# Patient Record
Sex: FEMALE | Race: WHITE | NOT HISPANIC OR LATINO | Employment: OTHER | URBAN - METROPOLITAN AREA
[De-identification: names, ages, dates, MRNs, and addresses within clinical notes are randomized per-mention and may not be internally consistent; named-entity substitution may affect disease eponyms.]

---

## 2017-01-01 ENCOUNTER — APPOINTMENT (OUTPATIENT)
Dept: CT IMAGING | Facility: HOSPITAL | Age: 57
End: 2017-01-01

## 2017-01-01 ENCOUNTER — HOSPITAL ENCOUNTER (INPATIENT)
Facility: HOSPITAL | Age: 57
LOS: 5 days | End: 2017-01-07
Attending: EMERGENCY MEDICINE | Admitting: FAMILY MEDICINE

## 2017-01-01 ENCOUNTER — APPOINTMENT (OUTPATIENT)
Dept: ULTRASOUND IMAGING | Facility: HOSPITAL | Age: 57
End: 2017-01-01

## 2017-01-01 ENCOUNTER — APPOINTMENT (OUTPATIENT)
Dept: GENERAL RADIOLOGY | Facility: HOSPITAL | Age: 57
End: 2017-01-01

## 2017-01-01 ENCOUNTER — APPOINTMENT (OUTPATIENT)
Dept: CARDIOLOGY | Facility: HOSPITAL | Age: 57
End: 2017-01-01
Attending: INTERNAL MEDICINE

## 2017-01-01 VITALS
RESPIRATION RATE: 10 BRPM | HEIGHT: 67 IN | SYSTOLIC BLOOD PRESSURE: 97 MMHG | HEART RATE: 100 BPM | OXYGEN SATURATION: 98 % | WEIGHT: 239.19 LBS | BODY MASS INDEX: 37.54 KG/M2 | TEMPERATURE: 98.3 F | DIASTOLIC BLOOD PRESSURE: 45 MMHG

## 2017-01-01 DIAGNOSIS — E87.5 HYPERKALEMIA: ICD-10-CM

## 2017-01-01 DIAGNOSIS — N17.9 ACUTE RENAL FAILURE, UNSPECIFIED ACUTE RENAL FAILURE TYPE (HCC): Primary | ICD-10-CM

## 2017-01-01 LAB
ALBUMIN SERPL-MCNC: 3.2 G/DL (ref 3.2–4.8)
ALBUMIN SERPL-MCNC: 3.4 G/DL (ref 3.2–4.8)
ALBUMIN SERPL-MCNC: 3.6 G/DL (ref 3.2–4.8)
ALBUMIN/GLOB SERPL: 1.2 G/DL (ref 1.5–2.5)
ALP SERPL-CCNC: 145 U/L (ref 25–100)
ALT SERPL W P-5'-P-CCNC: 17 U/L (ref 7–40)
ANION GAP SERPL CALCULATED.3IONS-SCNC: 13 MMOL/L (ref 3–11)
ANION GAP SERPL CALCULATED.3IONS-SCNC: 14 MMOL/L (ref 3–11)
ANION GAP SERPL CALCULATED.3IONS-SCNC: 16 MMOL/L (ref 3–11)
ANION GAP SERPL CALCULATED.3IONS-SCNC: 20 MMOL/L (ref 3–11)
AST SERPL-CCNC: 28 U/L (ref 0–33)
BASOPHILS # BLD AUTO: 0.01 10*3/MM3 (ref 0–0.2)
BASOPHILS NFR BLD AUTO: 0.1 % (ref 0–1)
BILIRUB SERPL-MCNC: 0.2 MG/DL (ref 0.3–1.2)
BNP SERPL-MCNC: 42 PG/ML (ref 0–100)
BUN BLD-MCNC: 120 MG/DL (ref 9–23)
BUN BLD-MCNC: 89 MG/DL (ref 9–23)
BUN BLD-MCNC: 97 MG/DL (ref 9–23)
BUN BLD-MCNC: 99 MG/DL (ref 9–23)
BUN BLDA-MCNC: 98 MG/DL (ref 8–26)
BUN/CREAT SERPL: 15.4 (ref 7–25)
BUN/CREAT SERPL: 16.2 (ref 7–25)
BUN/CREAT SERPL: 17 (ref 7–25)
BUN/CREAT SERPL: 17.8 (ref 7–25)
CA-I BLDA-SCNC: 1.43 MMOL/L (ref 1.2–1.32)
CALCIUM SPEC-SCNC: 11 MG/DL (ref 8.7–10.4)
CALCIUM SPEC-SCNC: 9.1 MG/DL (ref 8.7–10.4)
CALCIUM SPEC-SCNC: 9.4 MG/DL (ref 8.7–10.4)
CALCIUM SPEC-SCNC: 9.8 MG/DL (ref 8.7–10.4)
CHLORIDE BLDA-SCNC: 96 MMOL/L (ref 98–109)
CHLORIDE SERPL-SCNC: 94 MMOL/L (ref 99–109)
CHLORIDE SERPL-SCNC: 98 MMOL/L (ref 99–109)
CHLORIDE SERPL-SCNC: 98 MMOL/L (ref 99–109)
CHLORIDE SERPL-SCNC: 99 MMOL/L (ref 99–109)
CK SERPL-CCNC: 127 U/L (ref 26–174)
CO2 BLDA-SCNC: 22 MMOL/L (ref 24–29)
CO2 SERPL-SCNC: 12 MMOL/L (ref 20–31)
CO2 SERPL-SCNC: 17 MMOL/L (ref 20–31)
CO2 SERPL-SCNC: 19 MMOL/L (ref 20–31)
CO2 SERPL-SCNC: 22 MMOL/L (ref 20–31)
CREAT BLD-MCNC: 5 MG/DL (ref 0.6–1.3)
CREAT BLD-MCNC: 5.7 MG/DL (ref 0.6–1.3)
CREAT BLD-MCNC: 6.1 MG/DL (ref 0.6–1.3)
CREAT BLD-MCNC: 7.8 MG/DL (ref 0.6–1.3)
CREAT BLDA-MCNC: 5.8 MG/DL (ref 0.6–1.3)
DEPRECATED RDW RBC AUTO: 61.1 FL (ref 37–54)
EOSINOPHIL # BLD AUTO: 0 10*3/MM3 (ref 0.1–0.3)
EOSINOPHIL NFR BLD AUTO: 0 % (ref 0–3)
ERYTHROCYTE [DISTWIDTH] IN BLOOD BY AUTOMATED COUNT: 17.2 % (ref 11.3–14.5)
GFR SERPL CREATININE-BSD FRML MDRD: 5 ML/MIN/1.73
GFR SERPL CREATININE-BSD FRML MDRD: 7 ML/MIN/1.73
GFR SERPL CREATININE-BSD FRML MDRD: 8 ML/MIN/1.73
GFR SERPL CREATININE-BSD FRML MDRD: 9 ML/MIN/1.73
GLOBULIN UR ELPH-MCNC: 3.1 GM/DL
GLUCOSE BLD-MCNC: 108 MG/DL (ref 70–100)
GLUCOSE BLD-MCNC: 156 MG/DL (ref 70–100)
GLUCOSE BLD-MCNC: 88 MG/DL (ref 70–100)
GLUCOSE BLD-MCNC: 98 MG/DL (ref 70–100)
GLUCOSE BLDC GLUCOMTR-MCNC: 103 MG/DL (ref 70–130)
GLUCOSE BLDC GLUCOMTR-MCNC: 104 MG/DL (ref 70–130)
GLUCOSE BLDC GLUCOMTR-MCNC: 108 MG/DL (ref 70–130)
GLUCOSE BLDC GLUCOMTR-MCNC: 71 MG/DL (ref 70–130)
GLUCOSE BLDC GLUCOMTR-MCNC: 80 MG/DL (ref 70–130)
GLUCOSE BLDC GLUCOMTR-MCNC: 90 MG/DL (ref 70–130)
GLUCOSE BLDC GLUCOMTR-MCNC: 94 MG/DL (ref 70–130)
GLUCOSE BLDC GLUCOMTR-MCNC: 99 MG/DL (ref 70–130)
HCT VFR BLD AUTO: 27.6 % (ref 34.5–44)
HCT VFR BLDA CALC: 28 % (ref 38–51)
HGB BLD-MCNC: 9.1 G/DL (ref 11.5–15.5)
HGB BLDA-MCNC: 9.5 G/DL (ref 12–17)
HOLD SPECIMEN: NORMAL
HOLD SPECIMEN: NORMAL
IMM GRANULOCYTES # BLD: 0.08 10*3/MM3 (ref 0–0.03)
IMM GRANULOCYTES NFR BLD: 0.7 % (ref 0–0.6)
LIPASE SERPL-CCNC: 42 U/L (ref 6–51)
LYMPHOCYTES # BLD AUTO: 0.49 10*3/MM3 (ref 0.6–4.8)
LYMPHOCYTES NFR BLD AUTO: 4.4 % (ref 24–44)
MAGNESIUM SERPL-MCNC: 1.8 MG/DL (ref 1.3–2.7)
MCH RBC QN AUTO: 31.9 PG (ref 27–31)
MCHC RBC AUTO-ENTMCNC: 33 G/DL (ref 32–36)
MCV RBC AUTO: 96.8 FL (ref 80–99)
MONOCYTES # BLD AUTO: 0.53 10*3/MM3 (ref 0–1)
MONOCYTES NFR BLD AUTO: 4.8 % (ref 0–12)
NEUTROPHILS # BLD AUTO: 9.99 10*3/MM3 (ref 1.5–8.3)
NEUTROPHILS NFR BLD AUTO: 90 % (ref 41–71)
PHOSPHATE SERPL-MCNC: 5.8 MG/DL (ref 2.4–5.1)
PHOSPHATE SERPL-MCNC: 6.1 MG/DL (ref 2.4–5.1)
PHOSPHATE SERPL-MCNC: 8.9 MG/DL (ref 2.4–5.1)
PLATELET # BLD AUTO: 361 10*3/MM3 (ref 150–450)
PMV BLD AUTO: 9 FL (ref 6–12)
POTASSIUM BLD-SCNC: 6.3 MMOL/L (ref 3.5–5.5)
POTASSIUM BLD-SCNC: 6.3 MMOL/L (ref 3.5–5.5)
POTASSIUM BLD-SCNC: 6.6 MMOL/L (ref 3.5–5.5)
POTASSIUM BLD-SCNC: 7.6 MMOL/L (ref 3.5–5.5)
POTASSIUM BLDA-SCNC: 6.6 MMOL/L (ref 3.5–4.9)
PROT SERPL-MCNC: 6.7 G/DL (ref 5.7–8.2)
RBC # BLD AUTO: 2.85 10*6/MM3 (ref 3.89–5.14)
SODIUM BLD-SCNC: 128 MMOL/L (ref 132–146)
SODIUM BLD-SCNC: 131 MMOL/L (ref 132–146)
SODIUM BLD-SCNC: 131 MMOL/L (ref 132–146)
SODIUM BLD-SCNC: 132 MMOL/L (ref 132–146)
SODIUM BLDA-SCNC: 126 MMOL/L (ref 138–146)
TROPONIN I SERPL-MCNC: 0 NG/ML (ref 0–0.07)
URATE SERPL-MCNC: 14.1 MG/DL (ref 3.1–7.8)
WBC NRBC COR # BLD: 11.1 10*3/MM3 (ref 3.5–10.8)
WHOLE BLOOD HOLD SPECIMEN: NORMAL
WHOLE BLOOD HOLD SPECIMEN: NORMAL

## 2017-01-01 PROCEDURE — 25010000002 MORPHINE SULFATE (PF) 2 MG/ML SOLUTION: Performed by: INTERNAL MEDICINE

## 2017-01-01 PROCEDURE — 25010000002 MORPHINE SULFATE (PF) 2 MG/ML SOLUTION: Performed by: FAMILY MEDICINE

## 2017-01-01 PROCEDURE — 99232 SBSQ HOSP IP/OBS MODERATE 35: CPT | Performed by: INTERNAL MEDICINE

## 2017-01-01 PROCEDURE — 83735 ASSAY OF MAGNESIUM: CPT | Performed by: EMERGENCY MEDICINE

## 2017-01-01 PROCEDURE — 99221 1ST HOSP IP/OBS SF/LOW 40: CPT | Performed by: INTERNAL MEDICINE

## 2017-01-01 PROCEDURE — 25010000002 FUROSEMIDE PER 20 MG: Performed by: INTERNAL MEDICINE

## 2017-01-01 PROCEDURE — 74176 CT ABD & PELVIS W/O CONTRAST: CPT

## 2017-01-01 PROCEDURE — 82962 GLUCOSE BLOOD TEST: CPT

## 2017-01-01 PROCEDURE — 99233 SBSQ HOSP IP/OBS HIGH 50: CPT | Performed by: INTERNAL MEDICINE

## 2017-01-01 PROCEDURE — 80069 RENAL FUNCTION PANEL: CPT | Performed by: INTERNAL MEDICINE

## 2017-01-01 PROCEDURE — 25010000002 ONDANSETRON PER 1 MG: Performed by: FAMILY MEDICINE

## 2017-01-01 PROCEDURE — 71010 HC CHEST PA OR AP: CPT

## 2017-01-01 PROCEDURE — 80053 COMPREHEN METABOLIC PANEL: CPT | Performed by: EMERGENCY MEDICINE

## 2017-01-01 PROCEDURE — 84484 ASSAY OF TROPONIN QUANT: CPT

## 2017-01-01 PROCEDURE — 25010000002 HEPARIN (PORCINE) PER 1000 UNITS: Performed by: FAMILY MEDICINE

## 2017-01-01 PROCEDURE — 25010000002 DEXAMETHASONE PER 1 MG: Performed by: FAMILY MEDICINE

## 2017-01-01 PROCEDURE — 76775 US EXAM ABDO BACK WALL LIM: CPT

## 2017-01-01 PROCEDURE — 25010000002 LORAZEPAM PER 2 MG: Performed by: FAMILY MEDICINE

## 2017-01-01 PROCEDURE — 80047 BASIC METABLC PNL IONIZED CA: CPT

## 2017-01-01 PROCEDURE — 83880 ASSAY OF NATRIURETIC PEPTIDE: CPT | Performed by: EMERGENCY MEDICINE

## 2017-01-01 PROCEDURE — 85025 COMPLETE CBC W/AUTO DIFF WBC: CPT | Performed by: EMERGENCY MEDICINE

## 2017-01-01 PROCEDURE — 99223 1ST HOSP IP/OBS HIGH 75: CPT | Performed by: FAMILY MEDICINE

## 2017-01-01 PROCEDURE — 80048 BASIC METABOLIC PNL TOTAL CA: CPT | Performed by: FAMILY MEDICINE

## 2017-01-01 PROCEDURE — 82550 ASSAY OF CK (CPK): CPT | Performed by: INTERNAL MEDICINE

## 2017-01-01 PROCEDURE — 83690 ASSAY OF LIPASE: CPT | Performed by: EMERGENCY MEDICINE

## 2017-01-01 PROCEDURE — 63710000001 DEXAMETHASONE PER 0.25 MG: Performed by: FAMILY MEDICINE

## 2017-01-01 PROCEDURE — 85014 HEMATOCRIT: CPT

## 2017-01-01 PROCEDURE — 93005 ELECTROCARDIOGRAM TRACING: CPT

## 2017-01-01 PROCEDURE — 99284 EMERGENCY DEPT VISIT MOD MDM: CPT

## 2017-01-01 PROCEDURE — 84550 ASSAY OF BLOOD/URIC ACID: CPT | Performed by: INTERNAL MEDICINE

## 2017-01-01 PROCEDURE — 25010000002 HYDROMORPHONE PER 4 MG: Performed by: FAMILY MEDICINE

## 2017-01-01 PROCEDURE — 25010000002 HYDROMORPHONE PER 4 MG: Performed by: EMERGENCY MEDICINE

## 2017-01-01 PROCEDURE — 84100 ASSAY OF PHOSPHORUS: CPT | Performed by: EMERGENCY MEDICINE

## 2017-01-01 RX ORDER — PROMETHAZINE HYDROCHLORIDE 25 MG/ML
12.5 INJECTION, SOLUTION INTRAMUSCULAR; INTRAVENOUS EVERY 6 HOURS PRN
Status: DISCONTINUED | OUTPATIENT
Start: 2017-01-01 | End: 2017-01-01 | Stop reason: HOSPADM

## 2017-01-01 RX ORDER — DEXTROSE MONOHYDRATE 25 G/50ML
25 INJECTION, SOLUTION INTRAVENOUS AS NEEDED
Status: DISCONTINUED | OUTPATIENT
Start: 2017-01-01 | End: 2017-01-01 | Stop reason: HOSPADM

## 2017-01-01 RX ORDER — CALCIUM ACETATE 667 MG/1
667 CAPSULE ORAL
Status: DISCONTINUED | OUTPATIENT
Start: 2017-01-01 | End: 2017-01-01 | Stop reason: HOSPADM

## 2017-01-01 RX ORDER — NICOTINE POLACRILEX 4 MG
15 LOZENGE BUCCAL AS NEEDED
Status: DISCONTINUED | OUTPATIENT
Start: 2017-01-01 | End: 2017-01-01 | Stop reason: HOSPADM

## 2017-01-01 RX ORDER — NALOXONE HCL 0.4 MG/ML
0.4 VIAL (ML) INJECTION
Status: DISCONTINUED | OUTPATIENT
Start: 2017-01-01 | End: 2017-01-01

## 2017-01-01 RX ORDER — SODIUM CHLORIDE 9 MG/ML
75 INJECTION, SOLUTION INTRAVENOUS CONTINUOUS
Status: DISCONTINUED | OUTPATIENT
Start: 2017-01-01 | End: 2017-01-01

## 2017-01-01 RX ORDER — CITALOPRAM 40 MG/1
40 TABLET ORAL DAILY
Status: DISCONTINUED | OUTPATIENT
Start: 2017-01-01 | End: 2017-01-01 | Stop reason: HOSPADM

## 2017-01-01 RX ORDER — PROMETHAZINE HYDROCHLORIDE 12.5 MG/1
12.5 TABLET ORAL EVERY 6 HOURS PRN
Status: DISCONTINUED | OUTPATIENT
Start: 2017-01-01 | End: 2017-01-01 | Stop reason: HOSPADM

## 2017-01-01 RX ORDER — ONDANSETRON 2 MG/ML
4 INJECTION INTRAMUSCULAR; INTRAVENOUS ONCE
Status: COMPLETED | OUTPATIENT
Start: 2017-01-01 | End: 2017-01-01

## 2017-01-01 RX ORDER — SODIUM CHLORIDE 0.9 % (FLUSH) 0.9 %
1-10 SYRINGE (ML) INJECTION AS NEEDED
Status: DISCONTINUED | OUTPATIENT
Start: 2017-01-01 | End: 2017-01-01 | Stop reason: HOSPADM

## 2017-01-01 RX ORDER — GABAPENTIN 300 MG/1
300 CAPSULE ORAL EVERY 6 HOURS SCHEDULED
Status: DISCONTINUED | OUTPATIENT
Start: 2017-01-01 | End: 2017-01-01 | Stop reason: HOSPADM

## 2017-01-01 RX ORDER — FUROSEMIDE 10 MG/ML
40 INJECTION INTRAMUSCULAR; INTRAVENOUS ONCE
Status: COMPLETED | OUTPATIENT
Start: 2017-01-01 | End: 2017-01-01

## 2017-01-01 RX ORDER — LIDOCAINE 50 MG/G
1 PATCH TOPICAL
Status: DISCONTINUED | OUTPATIENT
Start: 2017-01-01 | End: 2017-01-01 | Stop reason: HOSPADM

## 2017-01-01 RX ORDER — PANTOPRAZOLE SODIUM 40 MG/10ML
40 INJECTION, POWDER, LYOPHILIZED, FOR SOLUTION INTRAVENOUS EVERY 12 HOURS SCHEDULED
Status: DISCONTINUED | OUTPATIENT
Start: 2017-01-01 | End: 2017-01-01 | Stop reason: HOSPADM

## 2017-01-01 RX ORDER — POLYETHYLENE GLYCOL 3350 17 G/17G
17 POWDER, FOR SOLUTION ORAL DAILY PRN
Status: DISCONTINUED | OUTPATIENT
Start: 2017-01-01 | End: 2017-01-01 | Stop reason: HOSPADM

## 2017-01-01 RX ORDER — SODIUM CHLORIDE 0.9 % (FLUSH) 0.9 %
10 SYRINGE (ML) INJECTION AS NEEDED
Status: DISCONTINUED | OUTPATIENT
Start: 2017-01-01 | End: 2017-01-01 | Stop reason: HOSPADM

## 2017-01-01 RX ORDER — HEPARIN SODIUM 5000 [USP'U]/ML
5000 INJECTION, SOLUTION INTRAVENOUS; SUBCUTANEOUS EVERY 8 HOURS
Status: DISCONTINUED | OUTPATIENT
Start: 2017-01-01 | End: 2017-01-01 | Stop reason: HOSPADM

## 2017-01-01 RX ORDER — SODIUM POLYSTYRENE SULFONATE 15 G/60ML
15 SUSPENSION ORAL; RECTAL ONCE
Status: DISCONTINUED | OUTPATIENT
Start: 2017-01-01 | End: 2017-01-01 | Stop reason: HOSPADM

## 2017-01-01 RX ORDER — FENTANYL 50 UG/H
1 PATCH TRANSDERMAL
Status: DISCONTINUED | OUTPATIENT
Start: 2017-01-01 | End: 2017-01-01 | Stop reason: HOSPADM

## 2017-01-01 RX ORDER — NALOXONE HCL 0.4 MG/ML
0.1 VIAL (ML) INJECTION
Status: DISCONTINUED | OUTPATIENT
Start: 2017-01-01 | End: 2017-01-01 | Stop reason: HOSPADM

## 2017-01-01 RX ORDER — LORAZEPAM 2 MG/ML
0.5 INJECTION INTRAMUSCULAR EVERY 6 HOURS PRN
Status: DISCONTINUED | OUTPATIENT
Start: 2017-01-01 | End: 2017-01-01 | Stop reason: HOSPADM

## 2017-01-01 RX ORDER — MORPHINE SULFATE 2 MG/ML
1 INJECTION, SOLUTION INTRAMUSCULAR; INTRAVENOUS
Status: DISCONTINUED | OUTPATIENT
Start: 2017-01-01 | End: 2017-01-01

## 2017-01-01 RX ORDER — GLYCOPYRROLATE 0.2 MG/ML
0.2 INJECTION INTRAMUSCULAR; INTRAVENOUS 4 TIMES DAILY
Status: DISCONTINUED | OUTPATIENT
Start: 2017-01-01 | End: 2017-01-01 | Stop reason: HOSPADM

## 2017-01-01 RX ORDER — FENTANYL 25 UG/H
1 PATCH TRANSDERMAL
Status: DISCONTINUED | OUTPATIENT
Start: 2017-01-01 | End: 2017-01-01

## 2017-01-01 RX ORDER — DEXAMETHASONE SODIUM PHOSPHATE 4 MG/ML
4 INJECTION, SOLUTION INTRA-ARTICULAR; INTRALESIONAL; INTRAMUSCULAR; INTRAVENOUS; SOFT TISSUE DAILY
Status: DISCONTINUED | OUTPATIENT
Start: 2017-01-01 | End: 2017-01-01 | Stop reason: HOSPADM

## 2017-01-01 RX ORDER — HYDROCODONE BITARTRATE AND ACETAMINOPHEN 5; 325 MG/1; MG/1
1 TABLET ORAL EVERY 4 HOURS PRN
Status: DISCONTINUED | OUTPATIENT
Start: 2017-01-01 | End: 2017-01-01 | Stop reason: HOSPADM

## 2017-01-01 RX ORDER — ONDANSETRON 2 MG/ML
4 INJECTION INTRAMUSCULAR; INTRAVENOUS EVERY 6 HOURS PRN
Status: DISCONTINUED | OUTPATIENT
Start: 2017-01-01 | End: 2017-01-01 | Stop reason: HOSPADM

## 2017-01-01 RX ORDER — MORPHINE SULFATE 2 MG/ML
1 INJECTION, SOLUTION INTRAMUSCULAR; INTRAVENOUS EVERY 4 HOURS PRN
Status: DISCONTINUED | OUTPATIENT
Start: 2017-01-01 | End: 2017-01-01

## 2017-01-01 RX ORDER — ATORVASTATIN CALCIUM 40 MG/1
40 TABLET, FILM COATED ORAL DAILY
Status: DISCONTINUED | OUTPATIENT
Start: 2017-01-01 | End: 2017-01-01 | Stop reason: HOSPADM

## 2017-01-01 RX ORDER — ASPIRIN 81 MG/1
324 TABLET, CHEWABLE ORAL ONCE
Status: COMPLETED | OUTPATIENT
Start: 2017-01-01 | End: 2017-01-01

## 2017-01-01 RX ORDER — MORPHINE SULFATE 2 MG/ML
1 INJECTION, SOLUTION INTRAMUSCULAR; INTRAVENOUS
Status: DISCONTINUED | OUTPATIENT
Start: 2017-01-01 | End: 2017-01-01 | Stop reason: HOSPADM

## 2017-01-01 RX ORDER — DEXAMETHASONE 4 MG/1
4 TABLET ORAL
Status: DISCONTINUED | OUTPATIENT
Start: 2017-01-01 | End: 2017-01-01

## 2017-01-01 RX ORDER — FUROSEMIDE 10 MG/ML
80 INJECTION INTRAMUSCULAR; INTRAVENOUS ONCE
Status: COMPLETED | OUTPATIENT
Start: 2017-01-01 | End: 2017-01-01

## 2017-01-01 RX ORDER — SODIUM POLYSTYRENE SULFONATE 15 G/60ML
30 SUSPENSION ORAL; RECTAL ONCE
Status: COMPLETED | OUTPATIENT
Start: 2017-01-01 | End: 2017-01-01

## 2017-01-01 RX ORDER — NALOXONE HCL 0.4 MG/ML
0.4 VIAL (ML) INJECTION
Status: DISCONTINUED | OUTPATIENT
Start: 2017-01-01 | End: 2017-01-01 | Stop reason: HOSPADM

## 2017-01-01 RX ADMIN — MORPHINE SULFATE 1 MG: 2 INJECTION, SOLUTION INTRAMUSCULAR; INTRAVENOUS at 21:21

## 2017-01-01 RX ADMIN — LORAZEPAM 0.5 MG: 2 INJECTION INTRAMUSCULAR; INTRAVENOUS at 08:14

## 2017-01-01 RX ADMIN — PANTOPRAZOLE SODIUM 40 MG: 40 INJECTION, POWDER, FOR SOLUTION INTRAVENOUS at 08:37

## 2017-01-01 RX ADMIN — MORPHINE SULFATE 1 MG: 2 INJECTION, SOLUTION INTRAMUSCULAR; INTRAVENOUS at 00:28

## 2017-01-01 RX ADMIN — DEXAMETHASONE SODIUM PHOSPHATE 4 MG: 4 INJECTION, SOLUTION INTRA-ARTICULAR; INTRALESIONAL; INTRAMUSCULAR; INTRAVENOUS; SOFT TISSUE at 13:16

## 2017-01-01 RX ADMIN — Medication: at 22:26

## 2017-01-01 RX ADMIN — CALCIUM ACETATE 667 MG: 667 CAPSULE ORAL at 08:03

## 2017-01-01 RX ADMIN — CITALOPRAM HYDROBROMIDE 40 MG: 40 TABLET ORAL at 08:03

## 2017-01-01 RX ADMIN — MORPHINE SULFATE 1 MG: 2 INJECTION, SOLUTION INTRAMUSCULAR; INTRAVENOUS at 11:58

## 2017-01-01 RX ADMIN — SODIUM POLYSTYRENE SULFONATE 30 G: 15 SUSPENSION ORAL; RECTAL at 17:45

## 2017-01-01 RX ADMIN — HYDROCODONE BITARTATE AND ACETAMINOPHEN 1 TABLET: 5; 325 TABLET ORAL at 02:07

## 2017-01-01 RX ADMIN — MORPHINE SULFATE 1 MG: 2 INJECTION, SOLUTION INTRAMUSCULAR; INTRAVENOUS at 01:51

## 2017-01-01 RX ADMIN — MORPHINE SULFATE 0.5 MG: 2 INJECTION, SOLUTION INTRAMUSCULAR; INTRAVENOUS at 00:39

## 2017-01-01 RX ADMIN — PANTOPRAZOLE SODIUM 40 MG: 40 INJECTION, POWDER, FOR SOLUTION INTRAVENOUS at 08:17

## 2017-01-01 RX ADMIN — NYSTATIN 500000 UNITS: 100000 SUSPENSION ORAL at 21:27

## 2017-01-01 RX ADMIN — MORPHINE SULFATE 1 MG: 2 INJECTION, SOLUTION INTRAMUSCULAR; INTRAVENOUS at 11:15

## 2017-01-01 RX ADMIN — LIDOCAINE HYDROCHLORIDE: 20 SOLUTION ORAL; TOPICAL at 10:32

## 2017-01-01 RX ADMIN — CALCIUM ACETATE 667 MG: 667 CAPSULE ORAL at 17:45

## 2017-01-01 RX ADMIN — Medication: at 05:50

## 2017-01-01 RX ADMIN — ONDANSETRON 4 MG: 2 INJECTION INTRAMUSCULAR; INTRAVENOUS at 21:16

## 2017-01-01 RX ADMIN — FENTANYL 1 PATCH: 50 PATCH, EXTENDED RELEASE TRANSDERMAL at 00:52

## 2017-01-01 RX ADMIN — LIDOCAINE HYDROCHLORIDE: 20 SOLUTION ORAL; TOPICAL at 08:33

## 2017-01-01 RX ADMIN — LORAZEPAM 0.5 MG: 2 INJECTION INTRAMUSCULAR; INTRAVENOUS at 17:29

## 2017-01-01 RX ADMIN — LIDOCAINE HYDROCHLORIDE: 20 SOLUTION ORAL; TOPICAL at 08:04

## 2017-01-01 RX ADMIN — MORPHINE SULFATE 1 MG: 2 INJECTION, SOLUTION INTRAMUSCULAR; INTRAVENOUS at 05:40

## 2017-01-01 RX ADMIN — DEXAMETHASONE SODIUM PHOSPHATE 4 MG: 4 INJECTION, SOLUTION INTRA-ARTICULAR; INTRALESIONAL; INTRAMUSCULAR; INTRAVENOUS; SOFT TISSUE at 08:17

## 2017-01-01 RX ADMIN — Medication: at 14:30

## 2017-01-01 RX ADMIN — HYDROCODONE BITARTATE AND ACETAMINOPHEN 1 TABLET: 5; 325 TABLET ORAL at 08:01

## 2017-01-01 RX ADMIN — HYDROCODONE BITARTATE AND ACETAMINOPHEN 1 TABLET: 5; 325 TABLET ORAL at 08:14

## 2017-01-01 RX ADMIN — LIDOCAINE HYDROCHLORIDE: 20 SOLUTION ORAL; TOPICAL at 08:09

## 2017-01-01 RX ADMIN — NYSTATIN 500000 UNITS: 100000 SUSPENSION ORAL at 13:52

## 2017-01-01 RX ADMIN — LORAZEPAM 0.5 MG: 2 INJECTION INTRAMUSCULAR; INTRAVENOUS at 21:22

## 2017-01-01 RX ADMIN — Medication: at 14:19

## 2017-01-01 RX ADMIN — FUROSEMIDE 40 MG: 10 INJECTION, SOLUTION INTRAMUSCULAR; INTRAVENOUS at 18:37

## 2017-01-01 RX ADMIN — LORAZEPAM 0.5 MG: 2 INJECTION INTRAMUSCULAR; INTRAVENOUS at 15:06

## 2017-01-01 RX ADMIN — PANTOPRAZOLE SODIUM 40 MG: 40 INJECTION, POWDER, FOR SOLUTION INTRAVENOUS at 08:01

## 2017-01-01 RX ADMIN — LIDOCAINE HYDROCHLORIDE: 20 SOLUTION ORAL; TOPICAL at 13:42

## 2017-01-01 RX ADMIN — NYSTATIN 500000 UNITS: 100000 SUSPENSION ORAL at 11:51

## 2017-01-01 RX ADMIN — LIDOCAINE HYDROCHLORIDE: 20 SOLUTION ORAL; TOPICAL at 16:45

## 2017-01-01 RX ADMIN — LIDOCAINE 1 PATCH: 50 PATCH TOPICAL at 14:36

## 2017-01-01 RX ADMIN — LIDOCAINE 1 PATCH: 50 PATCH TOPICAL at 08:00

## 2017-01-01 RX ADMIN — GLYCOPYRROLATE 0.2 MG: 0.2 INJECTION, SOLUTION INTRAMUSCULAR; INTRAVENOUS at 00:38

## 2017-01-01 RX ADMIN — CALCIUM ACETATE 667 MG: 667 CAPSULE ORAL at 08:43

## 2017-01-01 RX ADMIN — LIDOCAINE HYDROCHLORIDE: 20 SOLUTION ORAL; TOPICAL at 21:30

## 2017-01-01 RX ADMIN — SODIUM CHLORIDE 1000 ML: 9 INJECTION, SOLUTION INTRAVENOUS at 15:49

## 2017-01-01 RX ADMIN — NYSTATIN 500000 UNITS: 100000 SUSPENSION ORAL at 08:32

## 2017-01-01 RX ADMIN — HYDROMORPHONE HYDROCHLORIDE 1 MG: 1 INJECTION, SOLUTION INTRAMUSCULAR; INTRAVENOUS; SUBCUTANEOUS at 16:33

## 2017-01-01 RX ADMIN — LORAZEPAM 0.5 MG: 2 INJECTION INTRAMUSCULAR; INTRAVENOUS at 08:52

## 2017-01-01 RX ADMIN — CALCIUM ACETATE 667 MG: 667 CAPSULE ORAL at 14:34

## 2017-01-01 RX ADMIN — Medication: at 16:11

## 2017-01-01 RX ADMIN — Medication: at 18:55

## 2017-01-01 RX ADMIN — NYSTATIN 500000 UNITS: 100000 SUSPENSION ORAL at 08:03

## 2017-01-01 RX ADMIN — PANTOPRAZOLE SODIUM 40 MG: 40 INJECTION, POWDER, FOR SOLUTION INTRAVENOUS at 21:28

## 2017-01-01 RX ADMIN — PANTOPRAZOLE SODIUM 40 MG: 40 INJECTION, POWDER, FOR SOLUTION INTRAVENOUS at 21:21

## 2017-01-01 RX ADMIN — FENTANYL 1 PATCH: 50 PATCH, EXTENDED RELEASE TRANSDERMAL at 22:11

## 2017-01-01 RX ADMIN — LIDOCAINE HYDROCHLORIDE: 20 SOLUTION ORAL; TOPICAL at 17:46

## 2017-01-01 RX ADMIN — SODIUM CHLORIDE 125 ML/HR: 9 INJECTION, SOLUTION INTRAVENOUS at 18:41

## 2017-01-01 RX ADMIN — HYDROCODONE BITARTATE AND ACETAMINOPHEN 1 TABLET: 5; 325 TABLET ORAL at 21:21

## 2017-01-01 RX ADMIN — NYSTATIN 500000 UNITS: 100000 SUSPENSION ORAL at 14:36

## 2017-01-01 RX ADMIN — Medication: at 10:07

## 2017-01-01 RX ADMIN — MORPHINE SULFATE 1 MG: 2 INJECTION, SOLUTION INTRAMUSCULAR; INTRAVENOUS at 21:17

## 2017-01-01 RX ADMIN — NYSTATIN 500000 UNITS: 100000 SUSPENSION ORAL at 18:52

## 2017-01-01 RX ADMIN — PANTOPRAZOLE SODIUM 40 MG: 40 INJECTION, POWDER, FOR SOLUTION INTRAVENOUS at 21:16

## 2017-01-01 RX ADMIN — HYDROMORPHONE HYDROCHLORIDE 0.5 MG: 1 INJECTION, SOLUTION INTRAMUSCULAR; INTRAVENOUS; SUBCUTANEOUS at 20:17

## 2017-01-01 RX ADMIN — ONDANSETRON 4 MG: 2 INJECTION INTRAMUSCULAR; INTRAVENOUS at 20:33

## 2017-01-01 RX ADMIN — PANTOPRAZOLE SODIUM 40 MG: 40 INJECTION, POWDER, FOR SOLUTION INTRAVENOUS at 01:50

## 2017-01-01 RX ADMIN — SODIUM CHLORIDE 1000 ML: 9 INJECTION, SOLUTION INTRAVENOUS at 18:30

## 2017-01-01 RX ADMIN — FUROSEMIDE 80 MG: 10 INJECTION, SOLUTION INTRAMUSCULAR; INTRAVENOUS at 14:44

## 2017-01-01 RX ADMIN — LIDOCAINE HYDROCHLORIDE: 20 SOLUTION ORAL; TOPICAL at 14:37

## 2017-01-01 RX ADMIN — SODIUM CHLORIDE 125 ML/HR: 9 INJECTION, SOLUTION INTRAVENOUS at 21:23

## 2017-01-01 RX ADMIN — CITALOPRAM HYDROBROMIDE 40 MG: 40 TABLET ORAL at 08:39

## 2017-01-01 RX ADMIN — LIDOCAINE 1 PATCH: 50 PATCH TOPICAL at 08:17

## 2017-01-01 RX ADMIN — HYDROCODONE BITARTATE AND ACETAMINOPHEN 1 TABLET: 5; 325 TABLET ORAL at 13:40

## 2017-01-01 RX ADMIN — NYSTATIN 500000 UNITS: 100000 SUSPENSION ORAL at 21:21

## 2017-01-01 RX ADMIN — LIDOCAINE HYDROCHLORIDE: 20 SOLUTION ORAL; TOPICAL at 11:17

## 2017-01-01 RX ADMIN — NYSTATIN 500000 UNITS: 100000 SUSPENSION ORAL at 19:36

## 2017-01-01 RX ADMIN — Medication: at 06:31

## 2017-01-01 RX ADMIN — Medication: at 18:36

## 2017-01-01 RX ADMIN — ASPIRIN 81 MG 324 MG: 81 TABLET ORAL at 15:49

## 2017-01-02 PROBLEM — R18.0 ASCITES, MALIGNANT: Status: ACTIVE | Noted: 2017-01-01

## 2017-01-02 PROBLEM — D64.81 ANEMIA DUE TO CHEMOTHERAPY: Status: ACTIVE | Noted: 2017-01-01

## 2017-01-02 PROBLEM — G89.3 MALIGNANT BONE PAIN: Status: ACTIVE | Noted: 2017-01-01

## 2017-01-02 PROBLEM — R11.2 N&V (NAUSEA AND VOMITING): Status: ACTIVE | Noted: 2017-01-01

## 2017-01-02 PROBLEM — E11.9 DIABETES MELLITUS, TYPE 2 (HCC): Status: ACTIVE | Noted: 2017-01-01

## 2017-01-02 PROBLEM — J96.01 ACUTE HYPOXEMIC RESPIRATORY FAILURE (HCC): Status: ACTIVE | Noted: 2017-01-01

## 2017-01-02 PROBLEM — N17.9 ACUTE RENAL FAILURE (HCC): Status: ACTIVE | Noted: 2017-01-01

## 2017-01-02 PROBLEM — E87.5 HYPERKALEMIA: Status: ACTIVE | Noted: 2017-01-01

## 2017-01-02 PROBLEM — M89.8X9 MALIGNANT BONE PAIN: Status: ACTIVE | Noted: 2017-01-01

## 2017-01-02 PROBLEM — T45.1X5A ANEMIA DUE TO CHEMOTHERAPY: Status: ACTIVE | Noted: 2017-01-01

## 2017-01-02 NOTE — Clinical Note
Level of Care: Telemetry [5]   Diagnosis: Acute renal failure, unspecified acute renal failure type [[id number]]   Admitting Physician: KERRI SERRANO [[id number]]   Attending Physician: KERRI SERRANO [[id number]]

## 2017-01-02 NOTE — ED PROVIDER NOTES
Subjective   HPI Comments: Clary Morgan is a 56 y.o.female with hx of metastatic breast CA with bone involvement who presents to the ED with c/o CP and vomiting. Pt states that she began having an intermittent central chest pain several days ago and yesterday began having nausea and vomiting with any PO intake and has not been able to take her medications. Today she began having abdominal pain as well and due to not being able to take her medications, she comes to the ED. In the ED, her relative states that she has had periods of confusion and fatigue which wax and wane and that she last ate yesterday afternoon.     She was recently started on Glyperide and lipitor and reports that she stopped her lisinopril yesterday due to having a low BP.     Patient is a 56 y.o. female presenting with chest pain.   History provided by:  Patient  Chest Pain   Pain location:  Substernal area  Pain radiates to:  Does not radiate  Pain severity:  Moderate  Onset quality:  Sudden  Duration: past several days.  Timing:  Intermittent  Progression:  Unchanged  Chronicity:  New  Associated symptoms: fatigue, nausea and vomiting    Associated symptoms: no fever        Review of Systems   Constitutional: Positive for fatigue. Negative for fever.   Cardiovascular: Positive for chest pain.   Gastrointestinal: Positive for nausea and vomiting.   Psychiatric/Behavioral: Positive for confusion.   All other systems reviewed and are negative.      Past Medical History   Diagnosis Date   • Anxiety    • Back pain    • Breast cancer    • Cancer    • Depression    • Diabetes mellitus      dx 14 years ago-does not check fsbs    • Diabetic neuropathy 6/23/2016   • Dizziness      occ   • Fibromyalgia    • Hyperlipemia    • Kidney infection    • Malignant hyperthermia due to anesthesia      mother HX of malignant hyperthermia   • MRSA (methicillin resistant Staphylococcus aureus)      left toe 2 years ago- and 13 years ago-port infected- seen by  infectious disease- treated by podiatrist and PCP   • Wears eyeglasses        Allergies   Allergen Reactions   • Bactrim [Sulfamethoxazole-Trimethoprim] Nausea And Vomiting   • Keflex [Cephalexin] Nausea And Vomiting   • Penicillins      childhood       Past Surgical History   Procedure Laterality Date   • Tubal abdominal ligation     • Knee arthroscopy     • Mastectomy Bilateral    • Ureter surgery  1967     dilation and re-implantion   • Portacath placement     • Colonoscopy       2015   • Total laparoscopic hysterectomy, laparoscopic node dissection N/A 8/19/2016     Procedure: ROBOTIC TOTAL LAPAROSCOPIC HYSTERECTOMY, BILATERAL SALPINGO-OOPHORECTOMY WITH LYMPH NODE DISSECTION , omental biopsy;  Surgeon: Radha Carrera MD;  Location: [facility];  Service:        Family History   Problem Relation Age of Onset   • Cancer Mother    • Anesthesia problems Mother    • Diabetes Father    • Hypertension Father    • Stroke Father    • Diabetes Sister    • Hypertension Sister    • No Known Problems Sister    • No Known Problems Brother        Social History     Social History   • Marital status: Single     Spouse name: N/A   • Number of children: 2   • Years of education: N/A     Social History Main Topics   • Smoking status: Former Smoker     Types: Cigarettes   • Smokeless tobacco: Never Used      Comment: quit 14 years ago   • Alcohol use 0.6 oz/week     1 Glasses of wine per week      Comment: occ   • Drug use: No   • Sexual activity: No     Other Topics Concern   • None     Social History Narrative   • None         Objective   Physical Exam   Constitutional: She is oriented to person, place, and time. She appears well-developed and well-nourished. She is cooperative.  Non-toxic appearance. No distress.   HENT:   Head: Normocephalic and atraumatic.   Mouth/Throat: Oropharynx is clear and moist and mucous membranes are normal.   Eyes: Conjunctivae and EOM are normal. No scleral icterus.   Neck: Normal range of motion.  Neck supple.   Cardiovascular: Regular rhythm and normal heart sounds.  Tachycardia present.    No murmur heard.  Pulmonary/Chest: Effort normal and breath sounds normal. No respiratory distress. She has no wheezes. She has no rhonchi. She has no rales.   Abdominal: Soft. Bowel sounds are normal. She exhibits distension. There is no tenderness. There is no rebound and no guarding.   Musculoskeletal: Normal range of motion. She exhibits no edema.   Neurological: She is alert and oriented to person, place, and time. She has normal strength.   Skin: Skin is warm and dry. No rash noted.   Psychiatric: She has a normal mood and affect. Her speech is normal and behavior is normal.   Nursing note and vitals reviewed.      Procedures         ED Course  ED Course   Acute renal failure.  Hyperkalemia but EKG shows ST, no widening or peaked T waves or ectopy.  Patient stable on serial rechecks.  Discussed exam findings, test results so far and concerns in detail at the bedside.  Discussed need for admission for further evaluation and treatment.                    MDM  Number of Diagnoses or Management Options  Acute renal failure, unspecified acute renal failure type:   Hyperkalemia:      Amount and/or Complexity of Data Reviewed  Clinical lab tests: ordered and reviewed  Tests in the radiology section of CPT®: ordered  Decide to obtain previous medical records or to obtain history from someone other than the patient: yes  Discuss the patient with other providers: yes  Independent visualization of images, tracings, or specimens: yes        Final diagnoses:   Acute renal failure, unspecified acute renal failure type   Hyperkalemia       Documentation assistance provided by polo Moreira.  Information recorded by the polo was done at my direction and has been verified and validated by me.     Clive Moreira  01/02/17 1421       Clive Moreira  01/02/17 1536       Wyatt Chamorro MD  01/02/17 6216

## 2017-01-03 NOTE — PLAN OF CARE
Problem: Patient Care Overview (Adult)  Goal: Plan of Care Review  Outcome: Ongoing (interventions implemented as appropriate)    01/03/17 1033   Coping/Psychosocial Response Interventions   Plan Of Care Reviewed With patient   Patient Care Overview   Progress declining   Outcome Evaluation   Outcome Summary/Follow up Plan New Palliative Consult, cancer progressing, advanced breast cancer, endometrial cancer, patient wants aggressive care and to continue to be full code until son gets here, if unable to make decisions wants her sister that she lives with to make her health care decision, SW will go and see to help with advance directive

## 2017-01-03 NOTE — H&P
"    [facility] Medicine Services  HISTORY AND PHYSICAL    Primary Care Physician: KITTY Li    Subjective     Chief Complaint:   Abd distention, SOA, N/V    History of Present Illness:   56-year-old female with known bony metastatic ER positive breast cancer (s/p mastectomy in 2003) and aggressive endometrial cancer (s/p total hysterectomy Aug 2016) currently on second line chemotherapy by Dr. Lopez presents with ~2-3 days of worsening abdominal distention associated with reflux, N/V and inability to tolerate PO well.  She initially described her symptoms as \"chest pain\" but upon further history is really epigastric pain and reflux worsened by PO intake. She also complains of general fatigue which has escalated over last 2 weeks as well as FOREMAN and mild orthopnea.  Some intermittent left flank pain, \"dull\".  Urine has been concentrated, somewhat decreased output.  Her most recent chemo was 12/22/16, she had outpt 2u pRBC transfusion for chemo related anemia on 12/29/16.       Review of Systems   Constitutional: Positive for fatigue.   Respiratory: Positive for cough, chest tightness and shortness of breath.    Cardiovascular: Negative for chest pain.   Gastrointestinal: Positive for abdominal distention, abdominal pain, nausea and vomiting. Negative for blood in stool and diarrhea.   Genitourinary: Positive for decreased urine volume and flank pain. Negative for difficulty urinating, dysuria and hematuria.   All other systems reviewed and are negative.     Otherwise complete ROS performed and negative except as mentioned in the HPI.    Past Medical History:   Past Medical History   Diagnosis Date   • Anxiety    • Back pain    • Breast cancer    • Cancer    • Depression    • Diabetes mellitus      dx 14 years ago-does not check fsbs    • Diabetic neuropathy 6/23/2016   • Dizziness      occ   • Fibromyalgia    • Hyperlipemia    • Kidney infection    • Malignant hyperthermia due to " anesthesia      mother HX of malignant hyperthermia   • MRSA (methicillin resistant Staphylococcus aureus)      left toe 2 years ago- and 13 years ago-port infected- seen by infectious disease- treated by podiatrist and PCP   • Wears eyeglasses        Past Surgical History:  Past Surgical History   Procedure Laterality Date   • Tubal abdominal ligation     • Knee arthroscopy     • Mastectomy Bilateral    • Ureter surgery  1967     dilation and re-implantion   • Portacath placement     • Colonoscopy       2015   • Total laparoscopic hysterectomy, laparoscopic node dissection N/A 8/19/2016     Procedure: ROBOTIC TOTAL LAPAROSCOPIC HYSTERECTOMY, BILATERAL SALPINGO-OOPHORECTOMY WITH LYMPH NODE DISSECTION , omental biopsy;  Surgeon: Radha Carrera MD;  Location: [facility];  Service:        Family History: family history includes Anesthesia problems in her mother; Cancer in her mother; Diabetes in her father and sister; Hypertension in her father and sister; No Known Problems in her brother and sister; Stroke in her father.    Social History:  reports that she has quit smoking. Her smoking use included Cigarettes. She has never used smokeless tobacco. She reports that she drinks about 0.6 oz of alcohol per week  She reports that she does not use illicit drugs.    Medications:  Prescriptions Prior to Admission   Medication Sig Dispense Refill Last Dose   • atorvastatin (LIPITOR) 40 MG tablet take 1 tablet by mouth once daily  0 1/1/2017   • calcium acetate (PHOSLO) 667 MG capsule Take 667 mg by mouth 4 (four) times a day.   1/1/2017   • cholecalciferol (VITAMIN D3) 1000 UNITS tablet Take 5,000 Units by mouth daily.   1/1/2017   • citalopram (CeleXA) 40 MG tablet Take 40 mg by mouth Daily.   1/1/2017   • diphenhydrAMINE (BENADRYL) 25 mg capsule Take 25 mg by mouth every 6 (six) hours as needed for itching.   1/1/2017   • fentaNYL (DURAGESIC) 25 MCG/HR patch Place 1 patch on the skin every third day.   1/1/2017   •  "gabapentin (NEURONTIN) 300 MG capsule Take 300 mg by mouth 4 (four) times a day.   1/1/2017   • glimepiride (AMARYL) 4 MG tablet take 1 tablet once daily  0 1/1/2017   • HYDROcodone-acetaminophen (NORCO) 5-325 MG per tablet Take 1 tablet by mouth every 4 (four) hours as needed for moderate pain (4-6). 30 tablet 0 1/1/2017   • ibuprofen (ADVIL,MOTRIN) 200 MG tablet Take 800 mg by mouth 2 (two) times a day.   1/1/2017   • lisinopril (PRINIVIL,ZESTRIL) 2.5 MG tablet take 1 tablet by mouth once daily  0 1/1/2017   • metFORMIN (GLUCOPHAGE) 1000 MG tablet Take 1,000 mg by mouth 2 (two) times a day with meals.   1/1/2017   • ondansetron (ZOFRAN) 8 MG tablet Take 1 tablet by mouth 3 (Three) Times a Day As Needed for nausea or vomiting. 30 tablet 5 1/1/2017   • polyethylene glycol (MIRALAX) packet Take 17 g by mouth daily as needed (constipation). 119 g 0 1/1/2017   • traMADol (ULTRAM) 50 MG tablet Take 50 mg by mouth 2 (two) times a day.   1/1/2017       Allergies:  Allergies   Allergen Reactions   • Bactrim [Sulfamethoxazole-Trimethoprim] Nausea And Vomiting   • Keflex [Cephalexin] Nausea And Vomiting   • Penicillins      childhood         Objective     Physical Exam:  Vital Signs:   Visit Vitals   • BP 98/51 (BP Location: Right arm, Patient Position: Sitting)   • Pulse 112   • Temp 97.7 °F (36.5 °C) (Oral)   • Resp 18   • Ht 67\" (170.2 cm)   • Wt 239 lb 3 oz (108 kg)   • SpO2 96%   • BMI 37.46 kg/m2     Physical Exam  Temp:  [97.7 °F (36.5 °C)-98 °F (36.7 °C)] 97.7 °F (36.5 °C)  Heart Rate:  [] 112  Resp:  [16-18] 18  BP: ()/(39-75) 98/51  Constitutional: no acute distress, awake, alert  Eyes: PERRLA, sclerae anicteric, no conjunctival injection  Neck: supple, no thyromegaly, trachea midline  Respiratory: Clear to auscultation bilaterally, nonlabored respirations   Cardiovascular: RRR, no murmurs, rubs, or gallops, palpable pedal pulses bilaterally  Gastrointestinal: Positive bowel sounds, soft, mild " "epigastric tenderness, moderate ascites distention  Musculoskeletal: 1+ ankle edema, no clubbing or cyanosis to bilateral lower extremities  Psychiatric: oriented x 3, appropriate affect, cooperative  Neurologic: Strength symmetric in all extremities, Cranial Nerves grossly intact to confrontation           Results Reviewed:    Results from last 7 days  Lab Units 01/02/17  1521   WBC 10*3/mm3 11.10*   HEMOGLOBIN g/dL 9.1*   PLATELETS 10*3/mm3 361       Results from last 7 days  Lab Units 01/02/17  1521   SODIUM mmol/L 132   POTASSIUM mmol/L 6.6*   TOTAL CO2 mmol/L 22.0   CREATININE mg/dL 5.00*   GLUCOSE mg/dL 108*   CALCIUM mg/dL 11.0*       I have personally reviewed and interpreted available lab data, radiology studies and ECG obtained at time of admission.     Assessment / Plan     Assessment/Problem List:   Principal Problem:    Acute renal failure  Active Problems:    Stage IV ER positive breast cancer with bone metastasis    Metastatic cancer to bone    Diabetic neuropathy    Stage IIA Endometrial cancer    Diabetes mellitus, type 2    Ascites, likely malignant    N/V, dehydration    Anemia due to chemotherapy    Hyperkalemia    Malignant bone pain          Plan:    - ~30 min spent in direct patient conversation about ominous worsening of CT abd/pelv findings and explaining and negotiating with patient her current REALISTIC options for care.  She has some moments of bargaining and denial (wanted to talk about surgical consult for nephrectomy to \"get rid of the kidney and tumors\", wanted to know if we would \"take the extra belly fluid out so the cancer doesn't spread in it\" and asked if she could \"go on dialysis so I can keep getting chemo to see if it can work\").  - suspect ARF related partially to dehydration but also some post obstructive aspect related to advancing tumor/adenopathy around ureter/renal pelvis.   - Pt's newest CT abd/pelv is advanced compared to recent 12/8/16 scan despite current second line " "attempt at chemo as outpt.  Suspect timing is appropriate to consider transitioning to comfort oriented planning and possible Hospice.  Dr. Lopez to see tomorrow and also ordered Palliative Med consult to assist in planning.  **NOTE** pt's son who she hasn't seen in a \"long time\" coming to town next weekend pt determined to see him, specifically said \"I have to live until he gets here\".  - IVF's  - BID IV PPI for uncontrolled GERD sx's, prn Magic Mouth Wash swish and swallow.  - Ascites is not tense, pt is mild SOA but only with activity and she is currently able to lay flat.  No indication for therapeutic tap currently but suspect ascites may worsen with volume resusucitation.  - increase Fentanyl patch to 50mcg (was going to be done as outpt at next Pain Management appt, also was considering \"pain pump\" by Dr. Jaeger in [facility])        DVT prophylaxis:  SC hep  Code Status:  FULL until further specified, pt's son is coming from out of town this week and pt seemed very determined to be sure she would be able to see him so assume she is hesitant about AND until then.  Admission Status: Patient will be admitted to INPATIENT status due to the need for care which can only be reasonably provided in an hospital setting such as aggressive/expedited ancillary services and/or consultation services and the necessity for IV medications, close physician monitoring and/or the possible need for procedures.  In such, I feel patient’s risk for adverse outcomes and need for care warrant INPATIENT evaluation and predict the patient’s care encounter to likely last beyond 2 midnights.     Eliz Trujillo MD 01/02/17 10:38 PM        "

## 2017-01-03 NOTE — PLAN OF CARE
Problem: Patient Care Overview (Adult)  Goal: Adult Individualization and Mutuality  Outcome: Ongoing (interventions implemented as appropriate)

## 2017-01-03 NOTE — CONSULTS
Courtney Simeon, APRN  Consulting physician: Christi    Chief Complaint   Patient presents with   • Chest Pain         HPI: 57 yo with history of Stage IV met Breast Ca to bone now with endometrials cancer undergoing chemo. Presented to hospital with sever esophageal pain and ARF . Hydrated but fragile situation with cancer progression and AFF ho DM with neuropathy. Currently Full code. Lives with sister but familr disruption noted.          Past Medical History   Diagnosis Date   • Anxiety    • Back pain    • Breast cancer    • Cancer    • Depression    • Diabetes mellitus      dx 14 years ago-does not check fsbs    • Diabetic neuropathy 6/23/2016   • Dizziness      occ   • Fibromyalgia    • Hyperlipemia    • Kidney infection    • Malignant hyperthermia due to anesthesia      mother HX of malignant hyperthermia   • MRSA (methicillin resistant Staphylococcus aureus)      left toe 2 years ago- and 13 years ago-port infected- seen by infectious disease- treated by podiatrist and PCP   • Wears eyeglasses      Past Surgical History   Procedure Laterality Date   • Tubal abdominal ligation     • Knee arthroscopy     • Mastectomy Bilateral    • Ureter surgery  1967     dilation and re-implantion   • Portacath placement     • Colonoscopy       2015   • Total laparoscopic hysterectomy, laparoscopic node dissection N/A 8/19/2016     Procedure: ROBOTIC TOTAL LAPAROSCOPIC HYSTERECTOMY, BILATERAL SALPINGO-OOPHORECTOMY WITH LYMPH NODE DISSECTION , omental biopsy;  Surgeon: Radha Carrera MD;  Location: [facility];  Service:      Current Facility-Administered Medications   Medication Dose Route Frequency Provider Last Rate Last Dose   • atorvastatin (LIPITOR) tablet 40 mg  40 mg Oral Daily Eliz Trujillo MD   40 mg at 01/03/17 1022   • calcium acetate (PHOS BINDER)) capsule 667 mg  667 mg Oral TID With Meals Eliz Trujillo MD   667 mg at 01/03/17 1022   • citalopram (CeleXA) tablet 40 mg  40 mg Oral Daily Eliz Trujillo MD   40 mg at  01/03/17 1023   • dextrose (D50W) solution 25 g  25 g Intravenous PRN Eliz Trujillo MD       • dextrose (GLUTOSE) oral gel 15 g  15 g Oral PRN Eliz Trujillo MD       • fentaNYL (DURAGESIC) 50 MCG/HR patch 1 patch  1 patch Transdermal Q72H Eliz Trujillo MD   1 patch at 01/02/17 2211   • gabapentin (NEURONTIN) capsule 300 mg  300 mg Oral Q6H Eliz Trujillo MD   300 mg at 01/03/17 0008   • glucagon (GLUCAGEN) injection 1 mg  1 mg Subcutaneous Once PRN Eliz Trujillo MD       • heparin (porcine) 5000 UNIT/ML injection 5,000 Units  5,000 Units Subcutaneous Q8H Eliz Trujillo MD   5,000 Units at 01/02/17 2117   • HYDROcodone-acetaminophen (NORCO) 5-325 MG per tablet 1 tablet  1 tablet Oral Q4H PRN Eliz Trujillo MD   1 tablet at 01/03/17 1340   • HYDROmorphone (DILAUDID) PCA 0.1 mg/mL 30 mL syringe   Intravenous Continuous Andrey Ndiaye MD       • lidocaine viscous (XYLOCAINE) 2 % 20 mL, diphenhydrAMINE (BENADRYL) 12.5 MG/5ML 50 mg, aluminum-magnesium hydroxide-simethicone (MAALOX) 200-200-20 MG/5ML 20 mL cream   Topical Q3H Anil Dang MD       • LORazepam (ATIVAN) injection 0.5 mg  0.5 mg Intravenous Q6H PRN Eliz Trujillo MD       • Morphine sulfate (PF) injection 1 mg  1 mg Intravenous Q3H PRN Anil Dang MD        And   • naloxone (NARCAN) injection 0.4 mg  0.4 mg Intravenous Q5 Min PRN Anil Dang MD       • naloxone (NARCAN) injection 0.1 mg  0.1 mg Intravenous Q5 Min PRN Andrey Ndiaye MD       • nystatin (MYCOSTATIN) 457435 UNIT/ML suspension 500,000 Units  5 mL Oral 4x Daily Andrey Ndiaye MD   500,000 Units at 01/03/17 1352   • ondansetron (ZOFRAN) injection 4 mg  4 mg Intravenous Q6H PRN Eliz Trujillo MD   4 mg at 01/02/17 2116   • pantoprazole (PROTONIX) injection 40 mg  40 mg Intravenous Q12H Eliz Trujillo MD   40 mg at 01/03/17 0801   • Pharmacy Consult - Pharmacy to dose   Does not apply Continuous PRN Eliz Trujillo MD       • polyethylene glycol (MIRALAX) powder 17 g  17 g Oral Daily PRN Eliz Trujillo MD       •  promethazine (PHENERGAN) tablet 12.5 mg  12.5 mg Oral Q6H PRN Eliz Trujillo MD        Or   • promethazine (PHENERGAN) injection 12.5 mg  12.5 mg Intravenous Q6H PRN Eliz Trujillo MD       • sodium chloride 0.9 % flush 1-10 mL  1-10 mL Intravenous PRN Eliz Trujillo MD       • sodium chloride 0.9 % flush 10 mL  10 mL Intravenous PRN Wyatt Chamorro MD       • sodium chloride 0.9 % infusion  125 mL/hr Intravenous Continuous Eliz Trujillo  mL/hr at 01/02/17 2123 125 mL/hr at 01/02/17 2123   • sodium polystyrene (KAYEXALATE) 15 GM/60ML suspension 15 g  15 g Oral Once Eliz Trujillo MD   15 g at 01/02/17 2216     Facility-Administered Medications Ordered in Other Encounters   Medication Dose Route Frequency Provider Last Rate Last Dose   • heparin flush (porcine) 100 UNIT/ML injection 500 Units  500 Units Intracatheter PRN Anil Dang MD   500 Units at 08/17/16 1221   • sodium chloride 0.9 % flush 10 mL  10 mL Intravenous PRN Anil Dang MD   10 mL at 08/17/16 1221       HYDROmorphone     Pharmacy Consult - Pharmacy to dose     sodium chloride 125 mL/hr Last Rate: 125 mL/hr (01/02/17 2123)     dextrose  •  dextrose  •  glucagon (human recombinant)  •  HYDROcodone-acetaminophen  •  LORazepam  •  Morphine **AND** naloxone  •  naloxone  •  ondansetron  •  Pharmacy Consult - Pharmacy to dose  •  polyethylene glycol  •  promethazine **OR** promethazine  •  sodium chloride  •  sodium chloride  Allergies   Allergen Reactions   • Bactrim [Sulfamethoxazole-Trimethoprim] Nausea And Vomiting   • Keflex [Cephalexin] Nausea And Vomiting   • Penicillins      childhood     Family History   Problem Relation Age of Onset   • Cancer Mother    • Anesthesia problems Mother    • Diabetes Father    • Hypertension Father    • Stroke Father    • Diabetes Sister    • Hypertension Sister    • No Known Problems Sister    • No Known Problems Brother      Social History     Social History   • Marital status: Single     Spouse name:  "N/A   • Number of children: 2   • Years of education: N/A     Occupational History   • Not on file.     Social History Main Topics   • Smoking status: Former Smoker     Types: Cigarettes   • Smokeless tobacco: Never Used      Comment: quit 14 years ago   • Alcohol use 0.6 oz/week     1 Glasses of wine per week      Comment: occ   • Drug use: No   • Sexual activity: No     Other Topics Concern   • Not on file     Social History Narrative   • No narrative on file     Review of Systems - Negative except burn ache esophagus when swallows liguid. Pain abdomen deep ache.       Visit Vitals   • /47 (BP Location: Right arm, Patient Position: Lying)   • Pulse 97   • Temp 98 °F (36.7 °C) (Oral)   • Resp 18   • Ht 67\" (170.2 cm)   • Wt 239 lb 3 oz (108 kg)   • SpO2 96%   • BMI 37.46 kg/m2       Intake/Output Summary (Last 24 hours) at 01/03/17 1545  Last data filed at 01/03/17 0149   Gross per 24 hour   Intake   2000 ml   Output    150 ml   Net   1850 ml     Physical Exam:      General Appearance:    Alert, cooperative, in no acute distress   Head:    Normocephalic, without obvious abnormality, atraumatic   Eyes:            Lids and lashes normal, conjunctivae and sclerae normal, no   icterus, no pallor, corneas clear, PERRLA   Ears:    Ears appear intact with no abnormalities noted   Throat:   No oral lesions, no thrush, oral mucosa moist   Neck:   No adenopathy, supple, trachea midline, no thyromegaly, no     carotid bruit, no JVD   Back:        Lungs:     Clear to auscultation,respirations regular, even and                   unlabored    Heart:    Regular rhythm and normal rate, normal S1 and S2, no            murmur, no gallop, no rub, no click   Breast Exam:    Deferred   Abdomen:     Obese with ascites Normal bowel sounds, no masses, no organomegaly, soft        non-tender, non-distended, no guarding, no rebound                 tenderness   Genitalia:    Deferred   Extremities:   Moves all extremities well, no " edema, no cyanosis, no              redness   Pulses:   Pulses palpable and equal bilaterally   Skin:   No bleeding, bruising or rash   Lymph nodes:   No palpable adenopathy   Neurologic:   Cranial nerves 2 - 12 grossly intact, sensation intact, DTR        present and equal bilaterally         Results from last 7 days  Lab Units 01/02/17  1521   WBC 10*3/mm3 11.10*   HEMOGLOBIN g/dL 9.1*   HEMATOCRIT % 27.6*   PLATELETS 10*3/mm3 361       Results from last 7 days  Lab Units 01/02/17  1521   SODIUM mmol/L 132   POTASSIUM mmol/L 6.6*   CHLORIDE mmol/L 94*   TOTAL CO2 mmol/L 22.0   BUN mg/dL 89*   CREATININE mg/dL 5.00*   CALCIUM mg/dL 11.0*   BILIRUBIN mg/dL 0.2*   ALK PHOS U/L 145*   ALT (SGPT) U/L 17   AST (SGOT) U/L 28   GLUCOSE mg/dL 108*       Results from last 7 days  Lab Units 01/02/17  1521   SODIUM mmol/L 132   POTASSIUM mmol/L 6.6*   CHLORIDE mmol/L 94*   TOTAL CO2 mmol/L 22.0   BUN mg/dL 89*   CREATININE mg/dL 5.00*   GLUCOSE mg/dL 108*   CALCIUM mg/dL 11.0*     Imaging Results (last 72 hours)     Procedure Component Value Units Date/Time    CT Abdomen Pelvis Without Contrast [[id number]] Collected:  01/02/17 1707     Updated:  01/02/17 1716    Narrative:       EXAMINATION: CT ABDOMEN PELVIS WO CONTRAST-      INDICATION: renal failure, ascites, metastatic cancer; N17.9-Acute  kidney failure, unspecified; E87.5-Hyperkalemia         TECHNIQUE: 5 mm unenhanced images through the abdomen and pelvis     The radiation dose reduction device was turned on for each scan per the  ALARA (As Low as Reasonably Achievable) protocol.     COMPARISON: 12/8/2016 abdomen and pelvis CT scan     FINDINGS: Patient history indicates renal failure ascites metastatic  breast cancer, endometrial cancer.     There is a very small loculated pleural fluid collection with pleural  rind in the left lateral costophrenic angle unchanged. Lung bases  otherwise appear clear. New since the previous exam is interval  development of moderate  ascites, the greatest portion of which is in the  right upper quadrant. No significant abnormalities are seen of the  liver, spleen, pancreas, or adrenal glands. The gallbladder is difficult  to distinguish from surrounding ascites. No gallstones are identified.  The right kidney appears normal.      There are multiple left renal cysts as on prior study. There is also  apparently moderate hydronephrosis, further increased from prior study,  as a result of entrapment of the proximal left ureter by adenopathy in  the left midabdomen. There is moderate periaortic lymphadenopathy.     In addition to ascites, today's study shows a diffuse reticular pattern  throughout the anterior omental fat, whether from edema/anasarca, or  whether representing omental metastatic disease.     In the pelvis, the bladder is decompressed. Large and small bowel loops  are all decompressed as well. No inflammatory changes are seen. Note is  again made of patient's widespread blastic bony metastatic disease. No  lytic changes are seen.             Impression:       1. Widespread bony blastic metastatic disease and small loculated  chronic left pleural effusion, unchanged.  2. Interval development of moderate ascites, and extensive reticular  disease throughout the omentum, possibly omental edema but suspicious  for new omental metastatic disease.  3. Adenopathy/nodularity around the lower pole the left kidney with  entrapment of the left ureter and moderate hydronephrosis. Multiple left  renal cysts and moderate periaortic adenopathy also noted.        This report was finalized on 1/2/2017 5:14 PM by DR. Eros Burnham MD.       XR Chest 1 View [[id number]] Collected:  01/03/17 0946     Updated:  01/03/17 1152    Narrative:       EXAMINATION: XR CHEST 1 VW-      INDICATION: Chest pain triage protocol.      COMPARISON: None.     FINDINGS: The heart size is normal. Increased attenuation is present in  the retrocardiac region and left costophrenic  angle. A left subclavian  venous access port catheter ends in the superior vena cava. The right  lung is free of opacities.  The osseous structures of the chest are  normal.           Impression:       Increased attenuation in the retrocardiac region and left  lower lobe.     FAX TO: N     D:  01/03/2017  E:  01/03/2017     This report was finalized on 1/3/2017 11:50 AM by Dr. Franky Red MD.           Impression: Plan: Pain: place on PCA with HM at 0.1 mg q 30 minutes PRN and reassess. Continue fentanyl Kathie and ISS. Pain possibly Esophageal candidiasis so stsrt Nystatin and Magic mouthwash.Continue IV hydration and Protonix IV      Time: 35 minutes at bedside    Andrey Ndiaye MD  01/03/17  3:45 PM

## 2017-01-03 NOTE — NURSING NOTE
Pt is refusing to take oral medications other than lortab, refusing to wear her oxygen monitoring device, and refusing heparin shots. Frandy Correa RN

## 2017-01-03 NOTE — PROGRESS NOTES
Discharge Planning Assessment  [facility]     Patient Name: Clary Morgan  MRN: [id number]  Today's Date: 1/3/2017    Admit Date: 1/2/2017          Discharge Needs Assessment       01/03/17 1723    Living Environment    Lives With sibling(s)    Living Arrangements house    Provides Primary Care For no one    Quality Of Family Relationships supportive    Able to Return to Prior Living Arrangements yes    Discharge Needs Assessment    Concerns To Be Addressed discharge planning concerns    Readmission Within The Last 30 Days no previous admission in last 30 days    Anticipated Changes Related to Illness inability to care for self    Equipment Currently Used at Home glucometer;commode    Equipment Needed After Discharge hospital bed;shower chair    Transportation Available car;family or friend will provide    Discharge Disposition home or self-care;hospice/home    Discharge Contact Information if Applicable [phone]    Discharge Planning Comments home with sister Sai Winchester            Discharge Plan       01/03/17 1725    Case Management/Social Work Plan    Plan home with sister Sai Winchester    Patient/Family In Agreement With Plan yes    Additional Comments Met with Ms. Morgan and her sister, Viviane at bedside. Ms. Morgan and her sister live together in [place]. SisterViviane is her primary caregiver and states she needs help in caring for her at home as she has declined. Discussed hospice and Ms. Morgan would [facility] to [facility], but states her  says she doesn't qualify for hospice. If she is unable to go the [facility], she would prefer to stay here at [facility], [place] throughout her remaining days as she feels it is too much for her sister to manage and she feels like a burden. Both Ms. Morgan and her sister are agreeable for Debbie with Hospice to come by and talk with them. CM will contact Debbie in the a.m. Will continue to follow for any needs when medically  ready and closer to time of discharge.         Discharge Placement     No information found        Expected Discharge Date and Time     Expected Discharge Date Expected Discharge Time    Jan 5, 2017               Demographic Summary       01/03/17 1720    Referral Information    Admission Type inpatient    Arrived From home or self-care    Referral Source admission list    Reason For Consult discharge planning    Record Reviewed clinical discipline documentation;history and physical;medical record    Contact Information    Permission Granted to Share Information With ;family/designee    Comments sister Viviane    Primary Care Physician Information    Name Priscila Simeon            Functional Status       01/03/17 1721    Functional Status Current    Ambulation 3-->assistive equipment and person    Transferring 3-->assistive equipment and person    Toileting 3-->assistive equipment and person    Bathing 2-->assistive person    Dressing 2-->assistive person    Eating 0-->independent    Communication 0-->understands/communicates without difficulty    Swallowing (if score 2 or more for any item, consult Rehab Services) 2-->difficulty swallowing liquids/foods    Change in Functional Status Since Onset of Current Illness/Injury yes    Functional Status Prior    Ambulation 3-->assistive equipment and person    Transferring 3-->assistive equipment and person    Toileting 3-->assistive equipment and person    Bathing 2-->assistive person    Dressing 2-->assistive person    Eating 0-->independent    Communication 0-->understands/communicates without difficulty    Swallowing 2-->difficulty swallowing liquids/foods    IADL    Medications assistive person   has rx drug coverage. difficulty affording meds at times especially after the new year starting. Gave sister website resources to assist with any medications.     Meal Preparation completely dependent    Housekeeping completely dependent    Laundry completely  dependent    Shopping completely dependent    Oral Care independent    Activity Tolerance    Current Activity Limitations none    Usual Activity Tolerance fair    Current Activity Tolerance poor    Cognitive/Perceptual/Developmental    Current Mental Status/Cognitive Functioning no deficits noted    Recent Changes in Mental Status/Cognitive Functioning no changes    Employment/Financial    Financial Concerns none   has medicare a/b insurance with no recent changes to coverage            Psychosocial     None            Abuse/Neglect     None            Legal     None            Substance Abuse     None            Patient Forms     None          Laura Messina RN

## 2017-01-03 NOTE — CONSULTS
REFERRING PHYSICIAN: Dr. Hasmukh Barraza    PRIMARY CARE PROVIDER: KITTY Li    REASON FOR CONSULTATION: Progressive Malignancy    HISTORY OF PRESENT ILLNESS:  56-year-old lady with high-grade endometrial cancer presents to the hospital with worsening abdominal pain and dysphasia.  She has severe esophagitis from her chemotherapy involving Doxil.  She has significant abdominal progression with ascites.  She has poor appetite and is exceedingly uncomfortable at this time.      Allergies   Allergen Reactions   • Bactrim [Sulfamethoxazole-Trimethoprim] Nausea And Vomiting   • Keflex [Cephalexin] Nausea And Vomiting   • Penicillins      childhood       Past Medical History   Diagnosis Date   • Anxiety    • Back pain    • Breast cancer    • Cancer    • Depression    • Diabetes mellitus      dx 14 years ago-does not check fsbs    • Diabetic neuropathy 6/23/2016   • Dizziness      occ   • Fibromyalgia    • Hyperlipemia    • Kidney infection    • Malignant hyperthermia due to anesthesia      mother HX of malignant hyperthermia   • MRSA (methicillin resistant Staphylococcus aureus)      left toe 2 years ago- and 13 years ago-port infected- seen by infectious disease- treated by podiatrist and PCP   • Wears eyeglasses          Current Facility-Administered Medications:   •  atorvastatin (LIPITOR) tablet 40 mg, 40 mg, Oral, Daily, Eliz Trujillo MD, 40 mg at 01/03/17 1022  •  calcium acetate (PHOS BINDER)) capsule 667 mg, 667 mg, Oral, TID With Meals, Eliz Trujillo MD, 667 mg at 01/03/17 1022  •  citalopram (CeleXA) tablet 40 mg, 40 mg, Oral, Daily, Eliz Trujillo MD, 40 mg at 01/03/17 1023  •  dextrose (D50W) solution 25 g, 25 g, Intravenous, PRN, Eliz Trujillo MD  •  dextrose (GLUTOSE) oral gel 15 g, 15 g, Oral, PRN, Eliz Trujillo MD  •  fentaNYL (DURAGESIC) 50 MCG/HR patch 1 patch, 1 patch, Transdermal, Q72H, Eliz Trujillo MD, 1 patch at 01/02/17 2211  •  gabapentin (NEURONTIN) capsule 300 mg, 300 mg, Oral, Q6H, Eliz Trujillo MD,  300 mg at 01/03/17 0008  •  glucagon (GLUCAGEN) injection 1 mg, 1 mg, Subcutaneous, Once PRN, Eliz Trujillo MD  •  heparin (porcine) 5000 UNIT/ML injection 5,000 Units, 5,000 Units, Subcutaneous, Q8H, Eliz Trujillo MD, 5,000 Units at 01/02/17 2117  •  HYDROcodone-acetaminophen (NORCO) 5-325 MG per tablet 1 tablet, 1 tablet, Oral, Q4H PRN, Eliz Trujillo MD, 1 tablet at 01/03/17 1340  •  HYDROmorphone (DILAUDID) PCA 0.1 mg/mL 30 mL syringe, , Intravenous, Continuous, Andrey Ndiaye MD  •  insulin detemir (LEVEMIR) injection 8 Units, 8 Units, Subcutaneous, Nightly, Eliz Trujillo MD, 8 Units at 01/02/17 2119  •  insulin lispro (humaLOG) injection 2-7 Units, 2-7 Units, Subcutaneous, 4x Daily AC & at Bedtime, Eliz Trujillo MD, 2 Units at 01/02/17 2105  •  lidocaine viscous (XYLOCAINE) 2 % 20 mL, diphenhydrAMINE (BENADRYL) 12.5 MG/5ML 50 mg, aluminum-magnesium hydroxide-simethicone (MAALOX) 200-200-20 MG/5ML 20 mL cream, , Topical, Q3H, Anil Dang MD  •  LORazepam (ATIVAN) injection 0.5 mg, 0.5 mg, Intravenous, Q6H PRN, Eliz Trujillo MD  •  Morphine sulfate (PF) injection 1 mg, 1 mg, Intravenous, Q3H PRN **AND** naloxone (NARCAN) injection 0.4 mg, 0.4 mg, Intravenous, Q5 Min PRN, Anil Dang MD  •  naloxone (NARCAN) injection 0.1 mg, 0.1 mg, Intravenous, Q5 Min PRN, Andrey Ndiaye MD  •  nystatin (MYCOSTATIN) 983060 UNIT/ML suspension 500,000 Units, 5 mL, Oral, 4x Daily, Andrey Ndiaye MD, 500,000 Units at 01/03/17 1352  •  ondansetron (ZOFRAN) injection 4 mg, 4 mg, Intravenous, Q6H PRN, Eliz Trujillo MD, 4 mg at 01/02/17 2116  •  pantoprazole (PROTONIX) injection 40 mg, 40 mg, Intravenous, Q12H, Eliz Trujillo MD, 40 mg at 01/03/17 0801  •  Pharmacy Consult - Pharmacy to dose, , Does not apply, Continuous PRN, Eliz Trujillo MD  •  polyethylene glycol (MIRALAX) powder 17 g, 17 g, Oral, Daily PRN, Eliz Trujillo MD  •  promethazine (PHENERGAN) tablet 12.5 mg, 12.5 mg, Oral, Q6H PRN **OR** promethazine (PHENERGAN) injection 12.5 mg, 12.5  mg, Intravenous, Q6H PRN, Eliz Trujillo MD  •  sodium chloride 0.9 % flush 1-10 mL, 1-10 mL, Intravenous, PRN, Eliz Trujillo MD  •  sodium chloride 0.9 % flush 10 mL, 10 mL, Intravenous, PRN, Wyatt Chamorro MD  •  sodium chloride 0.9 % infusion, 125 mL/hr, Intravenous, Continuous, Eliz Trujillo MD, Last Rate: 125 mL/hr at 01/02/17 2123, 125 mL/hr at 01/02/17 2123  •  sodium polystyrene (KAYEXALATE) 15 GM/60ML suspension 15 g, 15 g, Oral, Once, Eliz Trujillo MD, 15 g at 01/02/17 2216    Facility-Administered Medications Ordered in Other Encounters:   •  heparin flush (porcine) 100 UNIT/ML injection 500 Units, 500 Units, Intracatheter, PRN, Anil Dang MD, 500 Units at 08/17/16 1221  •  sodium chloride 0.9 % flush 10 mL, 10 mL, Intravenous, PRN, Anil Dang MD, 10 mL at 08/17/16 1221    Past Surgical History   Procedure Laterality Date   • Tubal abdominal ligation     • Knee arthroscopy     • Mastectomy Bilateral    • Ureter surgery  1967     dilation and re-implantion   • Portacath placement     • Colonoscopy       2015   • Total laparoscopic hysterectomy, laparoscopic node dissection N/A 8/19/2016     Procedure: ROBOTIC TOTAL LAPAROSCOPIC HYSTERECTOMY, BILATERAL SALPINGO-OOPHORECTOMY WITH LYMPH NODE DISSECTION , omental biopsy;  Surgeon: Radha Carrera MD;  Location: [facility];  Service:        Social History     Social History   • Marital status: Single     Spouse name: N/A   • Number of children: 2   • Years of education: N/A     Social History Main Topics   • Smoking status: Former Smoker     Types: Cigarettes   • Smokeless tobacco: Never Used      Comment: quit 14 years ago   • Alcohol use 0.6 oz/week     1 Glasses of wine per week      Comment: occ   • Drug use: No   • Sexual activity: No     Other Topics Concern   • None     Social History Narrative   • None       Family History   Problem Relation Age of Onset   • Cancer Mother    • Anesthesia problems Mother    • Diabetes Father    •  Hypertension Father    • Stroke Father    • Diabetes Sister    • Hypertension Sister    • No Known Problems Sister    • No Known Problems Brother          REVIEW OF SYSTEMS:  A 14 point review of systems was performed and is negative except as noted below.    Review of Systems - Oncology      Objective     Vitals:    01/03/17 0149 01/03/17 0516 01/03/17 0800 01/03/17 1032   BP: 96/59 91/45 96/48 106/47   BP Location: Right arm Right arm Right arm Right arm   Patient Position: Sitting Lying Sitting Lying   Pulse: 101 94 97    Resp: 20 16 18 18   Temp: 97.5 °F (36.4 °C) 97.7 °F (36.5 °C) 98.3 °F (36.8 °C) 98 °F (36.7 °C)   TempSrc: Oral Oral Oral Oral   SpO2:   96%    Weight:       Height:           Performance Status: 2    Physical Exam    General: ill appearing female in no acute distress  HEENT: sclera anicteric, oropharynx clear  Lymphatics: no cervical, supraclavicular, or axillary adenopathy  Cardiovascular: regular rate and rhythm, no murmurs  Lungs: clear to auscultation bilaterally  Abdomen: Distended,   Extremeties: no lower extremity edema  Skin: no rashes, lesions, bruising, or petechiae      LABS:    Lab Results   Component Value Date    WBC 11.10 (H) 01/02/2017    HGB 9.1 (L) 01/02/2017    HCT 27.6 (L) 01/02/2017    MCV 96.8 01/02/2017     01/02/2017     Lab Results   Component Value Date    GLUCOSE 108 (H) 01/02/2017    BUN 89 (H) 01/02/2017    CREATININE 5.00 (H) 01/02/2017    CO2 22.0 01/02/2017    CALCIUM 11.0 (H) 01/02/2017    ALBUMIN 3.60 01/02/2017    AST 28 01/02/2017    ALT 17 01/02/2017         ASSESSMENT/PLAN:    1.  Progressive endometrial carcinoma: She is failing second line therapy with Doxil.  She has considerable disease progression and is fairly asymptomatic at this time.  Unfortunately she is also has decreased performance status.  She is in profound  renal failure due to likely dehydration and obstruction of her left ureter.  Her treatment options at this time is fairly  limited.  She has primary refractory disease in spite of 2 lines of chemotherapy.  I spoke with her and her sister today at length and recommended a palliative approach and focusing on symptom management rather than treatment.  I feel that hospice would be a reasonable approach also.  She wants to talk with the hospice coordinator regarding the services.      Anil Dang MD    1/3/2017      Please note that portions of this note may have been completed with a voice recognition program. Efforts were made to edit the dictations, but occasionally words are mistranscribed.

## 2017-01-03 NOTE — PROGRESS NOTES
[facility]  INPATIENT PROGRESS NOTE    Date of Admission: 1/2/2017  Length of Stay: 1  Primary Care Physician: KITTY Li    Subjective     Chief Complaint: chest pain   HPI:  Currently complaining of some mid-sternal and abdominal pain. Labs not drawn this morning.  PCA ordered but not up yet.    Review Of Systems:   Review of Systems   Constitutional: Positive for fatigue. Negative for fever.   Cardiovascular: Positive for chest pain and leg swelling.   Gastrointestinal: Positive for abdominal pain.   All other systems reviewed and are negative.    Objective      Temp:  [97.5 °F (36.4 °C)-98.3 °F (36.8 °C)] 98 °F (36.7 °C)  Heart Rate:  [] 97  Resp:  [16-20] 18  BP: ()/(41-75) 106/47  Physical Exam   Constitutional: She is oriented to person, place, and time. She appears well-developed and well-nourished.   Chronically ill appearing   HENT:   Head: Normocephalic.   Losing hair   Eyes: Pupils are equal, round, and reactive to light.   Cardiovascular: Normal rate, regular rhythm and normal heart sounds.    Pulmonary/Chest:   Diminished left base   Abdominal:   Distended, mild tenderness diffusely   Musculoskeletal: She exhibits edema.   Neurological: She is alert and oriented to person, place, and time.   Skin: Skin is warm and dry. No erythema.   Psychiatric: She has a normal mood and affect.   Vitals reviewed.    Results Review:    I have reviewed the labs, radiology results and diagnostic studies.      Results from last 7 days  Lab Units 01/02/17  1521   WBC 10*3/mm3 11.10*   HEMOGLOBIN g/dL 9.1*   PLATELETS 10*3/mm3 361       Results from last 7 days  Lab Units 01/02/17  1521   SODIUM mmol/L 132   POTASSIUM mmol/L 6.6*   TOTAL CO2 mmol/L 22.0   CREATININE mg/dL 5.00*   GLUCOSE mg/dL 108*     Radiology Data:   Abd CT - new asictes, progression of cancer, some left hydro  I have reviewed the medications.    Assessment/Plan     Assessment/Problem  List  Principal Problem:    Acute renal failure  Active Problems:    Stage IV ER positive breast cancer with bone metastasis    Metastatic cancer to bone    Diabetic neuropathy    Stage IIA Endometrial cancer    Diabetes mellitus, type 2    Ascites, likely malignant    N/V, dehydration    Anemia due to chemotherapy    Hyperkalemia    Malignant bone pain    Plan  - need to get labs to ensure K+ better  - ARF likely multifactorial d/t dehydration and obstructive uropathy  - reviewed Dr. Lopez's note.  No further treatment options and has progressed on 2nd line therapy.  Agree with plan to focus on comfort.  - palliative care consult pending, but PCA already ordered  - Will likely get hospice involved, plan probably will be home with hospice when symptoms are better controlled.  - d/w patient and family member.  She is generally accepting of her prognosis    High complexity.    Hasmukh Barraza MD   01/03/17   3:49 PM    Please note that portions of this note may have been completed with a voice recognition program. Efforts were made to edit the dictations, but occasionally words are mistranscribed.

## 2017-01-03 NOTE — PROGRESS NOTES
NAL Consult Note    Clary Morgan  1960  [id number]    Date of Admit:  1/2/2017    Date of Consult: 1/3/2017        Requesting Provider: No ref. provider found  Evaluating Physician: Stan Hogan MD        Reason for Consultation:  DONALDO  History of present illness:    Patient is a 56 y.o.  Yr old female WITH METASTATIC ENDOMETRIAL CANCER ,BREAST CA , DM WHO WE ARE ASKED TO SEE FOR SEVERE DONALDO AND HYPERKALEMIA. CREAT WAS 1.1 12/22/16 AND 5.0 1/2/16. DECREASED U/O PAST FEW DAYS. TAKING A LOT OF IBUPROFEN AT HOME RECENTLY FOR PAIN. CT SCAN REVEALED L HYDRONEPHROSIS. NO MENTION MADE OF R KIDNEY. K WAS 6.6 AND TX'ED WITH KAYEXALATE. HAS GOTTEN CHEMO.    Past Medical History   Diagnosis Date   • Anxiety    • Back pain    • Breast cancer    • Cancer    • Depression    • Diabetes mellitus      dx 14 years ago-does not check fsbs    • Diabetic neuropathy 6/23/2016   • Dizziness      occ   • Fibromyalgia    • Hyperlipemia    • Kidney infection    • Malignant hyperthermia due to anesthesia      mother HX of malignant hyperthermia   • MRSA (methicillin resistant Staphylococcus aureus)      left toe 2 years ago- and 13 years ago-port infected- seen by infectious disease- treated by podiatrist and PCP   • Wears eyeglasses        Past Surgical History   Procedure Laterality Date   • Tubal abdominal ligation     • Knee arthroscopy     • Mastectomy Bilateral    • Ureter surgery  1967     dilation and re-implantion   • Portacath placement     • Colonoscopy       2015   • Total laparoscopic hysterectomy, laparoscopic node dissection N/A 8/19/2016     Procedure: ROBOTIC TOTAL LAPAROSCOPIC HYSTERECTOMY, BILATERAL SALPINGO-OOPHORECTOMY WITH LYMPH NODE DISSECTION , omental biopsy;  Surgeon: Radha Carrera MD;  Location: [facility];  Service:        Social History     Social History   • Marital status: Single     Spouse name: N/A   • Number of children: 2   • Years of education: N/A     Social History Main Topics   •  Smoking status: Former Smoker     Types: Cigarettes   • Smokeless tobacco: Never Used      Comment: quit 14 years ago   • Alcohol use 0.6 oz/week     1 Glasses of wine per week      Comment: occ   • Drug use: No   • Sexual activity: No     Other Topics Concern   • None     Social History Narrative   • None       family history includes Anesthesia problems in her mother; Cancer in her mother; Diabetes in her father and sister; Hypertension in her father and sister; No Known Problems in her brother and sister; Stroke in her father. NO FH OF RENAL FAILURE.     Allergies   Allergen Reactions   • Bactrim [Sulfamethoxazole-Trimethoprim] Nausea And Vomiting   • Keflex [Cephalexin] Nausea And Vomiting   • Penicillins      childhood       Medication:    Current Facility-Administered Medications:   •  atorvastatin (LIPITOR) tablet 40 mg, 40 mg, Oral, Daily, Eliz Trujillo MD, 40 mg at 01/03/17 1022  •  calcium acetate (PHOS BINDER)) capsule 667 mg, 667 mg, Oral, TID With Meals, Eliz Trujillo MD, 667 mg at 01/03/17 1022  •  citalopram (CeleXA) tablet 40 mg, 40 mg, Oral, Daily, Eliz Trujillo MD, 40 mg at 01/03/17 1023  •  dextrose (D50W) solution 25 g, 25 g, Intravenous, PRN, Eliz Trujillo MD  •  dextrose (GLUTOSE) oral gel 15 g, 15 g, Oral, PRN, Eliz Trujillo MD  •  fentaNYL (DURAGESIC) 50 MCG/HR patch 1 patch, 1 patch, Transdermal, Q72H, Eliz Trujillo MD, 1 patch at 01/02/17 2211  •  gabapentin (NEURONTIN) capsule 300 mg, 300 mg, Oral, Q6H, Eliz Trujillo MD, 300 mg at 01/03/17 0008  •  glucagon (GLUCAGEN) injection 1 mg, 1 mg, Subcutaneous, Once PRN, Eliz Trujillo MD  •  heparin (porcine) 5000 UNIT/ML injection 5,000 Units, 5,000 Units, Subcutaneous, Q8H, Eliz Trujillo MD, 5,000 Units at 01/02/17 2117  •  HYDROcodone-acetaminophen (NORCO) 5-325 MG per tablet 1 tablet, 1 tablet, Oral, Q4H PRN, Eliz Trujillo MD, 1 tablet at 01/03/17 1340  •  insulin detemir (LEVEMIR) injection 8 Units, 8 Units, Subcutaneous, Nightly, Eliz Trujillo MD, 8 Units at  01/02/17 2119  •  insulin lispro (humaLOG) injection 2-7 Units, 2-7 Units, Subcutaneous, 4x Daily AC & at Bedtime, Eliz Trujillo MD, 2 Units at 01/02/17 2105  •  lidocaine viscous (XYLOCAINE) 2 % 20 mL, diphenhydrAMINE (BENADRYL) 12.5 MG/5ML 50 mg, aluminum-magnesium hydroxide-simethicone (MAALOX) 200-200-20 MG/5ML 20 mL cream, , Topical, Q3H, Anil Dang MD  •  LORazepam (ATIVAN) injection 0.5 mg, 0.5 mg, Intravenous, Q6H PRN, Eliz Trujillo MD  •  Morphine sulfate (PF) injection 1 mg, 1 mg, Intravenous, Q3H PRN **AND** naloxone (NARCAN) injection 0.4 mg, 0.4 mg, Intravenous, Q5 Min PRN, Anil Dang MD  •  nystatin (MYCOSTATIN) 249196 UNIT/ML suspension 500,000 Units, 5 mL, Oral, 4x Daily, Andrey Ndiaye MD, 500,000 Units at 01/03/17 1352  •  ondansetron (ZOFRAN) injection 4 mg, 4 mg, Intravenous, Q6H PRN, Eliz Trujillo MD, 4 mg at 01/02/17 2116  •  pantoprazole (PROTONIX) injection 40 mg, 40 mg, Intravenous, Q12H, Eliz Trujillo MD, 40 mg at 01/03/17 0801  •  Pharmacy Consult - Pharmacy to dose, , Does not apply, Continuous PRN, Eliz Trujillo MD  •  polyethylene glycol (MIRALAX) powder 17 g, 17 g, Oral, Daily PRN, Eliz Trujillo MD  •  promethazine (PHENERGAN) tablet 12.5 mg, 12.5 mg, Oral, Q6H PRN **OR** promethazine (PHENERGAN) injection 12.5 mg, 12.5 mg, Intravenous, Q6H PRN, Eliz Trujillo MD  •  sodium chloride 0.9 % flush 1-10 mL, 1-10 mL, Intravenous, PRN, Eliz Trujillo MD  •  sodium chloride 0.9 % flush 10 mL, 10 mL, Intravenous, PRN, Wyatt Chamorro MD  •  sodium chloride 0.9 % infusion, 125 mL/hr, Intravenous, Continuous, Eliz Trujillo MD, Last Rate: 125 mL/hr at 01/02/17 2123, 125 mL/hr at 01/02/17 2123  •  sodium polystyrene (KAYEXALATE) 15 GM/60ML suspension 15 g, 15 g, Oral, Once, Eliz Trujillo MD, 15 g at 01/02/17 2216    Facility-Administered Medications Ordered in Other Encounters:   •  heparin flush (porcine) 100 UNIT/ML injection 500 Units, 500 Units, Intracatheter, PRN, Anil Dang MD, 500  Units at 08/17/16 1221  •  sodium chloride 0.9 % flush 10 mL, 10 mL, Intravenous, PRN, Anil Dang MD, 10 mL at 08/17/16 1221    Prescriptions Prior to Admission   Medication Sig Dispense Refill Last Dose   • atorvastatin (LIPITOR) 40 MG tablet take 1 tablet by mouth once daily  0 1/1/2017   • calcium acetate (PHOSLO) 667 MG capsule Take 667 mg by mouth 4 (four) times a day.   1/1/2017   • cholecalciferol (VITAMIN D3) 1000 UNITS tablet Take 5,000 Units by mouth daily.   1/1/2017   • citalopram (CeleXA) 40 MG tablet Take 40 mg by mouth Daily.   1/1/2017   • diphenhydrAMINE (BENADRYL) 25 mg capsule Take 25 mg by mouth every 6 (six) hours as needed for itching.   1/1/2017   • fentaNYL (DURAGESIC) 25 MCG/HR patch Place 1 patch on the skin every third day.   1/1/2017   • gabapentin (NEURONTIN) 300 MG capsule Take 300 mg by mouth 4 (four) times a day.   1/1/2017   • glimepiride (AMARYL) 4 MG tablet take 1 tablet once daily  0 1/1/2017   • HYDROcodone-acetaminophen (NORCO) 5-325 MG per tablet Take 1 tablet by mouth every 4 (four) hours as needed for moderate pain (4-6). 30 tablet 0 1/1/2017   • ibuprofen (ADVIL,MOTRIN) 200 MG tablet Take 800 mg by mouth 2 (two) times a day.   1/1/2017   • lisinopril (PRINIVIL,ZESTRIL) 2.5 MG tablet take 1 tablet by mouth once daily  0 1/1/2017   • metFORMIN (GLUCOPHAGE) 1000 MG tablet Take 1,000 mg by mouth 2 (two) times a day with meals.   1/1/2017   • ondansetron (ZOFRAN) 8 MG tablet Take 1 tablet by mouth 3 (Three) Times a Day As Needed for nausea or vomiting. 30 tablet 5 1/1/2017   • polyethylene glycol (MIRALAX) packet Take 17 g by mouth daily as needed (constipation). 119 g 0 1/1/2017   • traMADol (ULTRAM) 50 MG tablet Take 50 mg by mouth 2 (two) times a day.   1/1/2017       Review of Systems:    Constitutional-- No Fever, chills or sweats. No significant change in weight  Eye-- no diplopia, no conjunctivitis  ENT-- No new hearing. + SORE throat complaints.  No epistaxis or  "oral sores. No odynophagia. + dysphagia. + headache. NO photophobia or neck stiffness.  CV-- + chest TIGHTNESS. NO palpitations. +Soa. NO edema  Resp-- No cough/Hemoptysis  GI- + nausea/vomiting. NO diarrhea.  No hematochezia, melena, or hematemesis. + ABD PAIN  --  DECREASED U/O. NO HEMATURIA.  Skin-- No rash, warm and dry  Lymph- no painful or swollen lymph nodes in neck/axilla or groin.   Heme- No active bruising or bleeding; no Hx of DVT or PE.  MS-- no swelling or pain in the joints  Neuro-- + FATIGUE/WEAKNESS  Psych--+ anxiety & depression  Endo--No cold or heat intolerance.  No polyuria, polydipsia, or polyphagia    Full review of systems reviewed and negative otherwise for acute complaints    Physical Exam:   Vital Signs   Blood pressure 106/47, pulse 97, temperature 98 °F (36.7 °C), temperature source Oral, resp. rate 18, height 67\" (170.2 cm), weight 239 lb 3 oz (108 kg), SpO2 96 %, not currently breastfeeding.     GENERAL: Awake and alert, in no acute distress.   HEENT: Normocephalic, atraumatic.  PER.  No conjunctivitis. No icterus. Oropharynx clear without evidence of thrush or exudate. No evidence of peridontal disease. MM DRY.   NECK: Supple without nuchal rigidity. No mass.  LYMPH: No painful cervical, axillary or inguinal lymphadenopathy.  HEART: RRR; No murmur, rubs, gallops. No bruits in neck, abdomen, or groins, no edema  LUNGS: Normal respiratory effort. Nonlabored. No dullness.  No crepitus.  Clear to auscultation bilaterally without wheezing, rales, rhonchi.  ABDOMEN: Soft, nontender. + distended. Positive bowel sounds. No rebound or guarding. NO mass or HSM.  JOINTS:  Full range of motion, no redness or tenderness.  EXT:  No cyanosis, clubbing or edema.  :  DEFFERED  SKIN: Warm and dry without rash  NEURO: Oriented to PPT. No focal neurological deficits. Strength equal bilateral  PSYCHIATRIC: Normal insight and judgement. Cooperative with PE    Laboratory Data    Results from last 7 " days  Lab Units 01/02/17  1521   HEMOGLOBIN g/dL 9.1*   HEMATOCRIT % 27.6*       Results from last 7 days  Lab Units 01/02/17  1521   SODIUM mmol/L 132   POTASSIUM mmol/L 6.6*   CHLORIDE mmol/L 94*   TOTAL CO2 mmol/L 22.0   BUN mg/dL 89*   CREATININE mg/dL 5.00*   CALCIUM mg/dL 11.0*   PHOSPHORUS mg/dL 6.1*   MAGNESIUM mg/dL 1.8   ALBUMIN g/dL 3.60       Results from last 7 days  Lab Units 01/02/17  1521   GLUCOSE mg/dL 108*           Results from last 7 days  Lab Units 01/02/17  1521   ALK PHOS U/L 145*   BILIRUBIN mg/dL 0.2*   ALT (SGPT) U/L 17   AST (SGOT) U/L 28     Estimated Creatinine Clearance: 16.1 mL/min (by C-G formula based on Cr of 5).    Radiology:          Impression: DONALDO LIKELY OLIGURIC AND PROBABLY A COMBINATION OF NSAIA'S, VOLUME DEPLETION AND OBSTRUCTION. HYPERKALEMIA. TX'ED.       PLAN: Thank you for asking us to see Clary Morgan, I recommend the following: AGREE WITH IVF AND KAYEXALATE. CHECK RENAL U/S TO R/O BILATERAL OBSTRUCTION. CHECK CPK AND URIC ACID. TALKED WITH PATIENT, HER SISTER AND DR PALMER. NONE OF VICK WANTS RRT. I AGREE WITH THIS DECISION. APPEARS TERMINAL. PALITIVE CARE BEING INVOLVED.         Stan Hogan MD  1/3/2017  2:01 PM

## 2017-01-03 NOTE — PLAN OF CARE
Problem: Renal Failure/Kidney Injury, Acute (Adult)  Goal: Signs and Symptoms of Listed Potential Problems Will be Absent or Manageable (Renal Failure/Kidney Injury, Acute)  Outcome: Ongoing (interventions implemented as appropriate)

## 2017-01-03 NOTE — NURSING NOTE
Pt arrived to floor with a 25 mcg fentanyl patch on Right chest wall. 25 mcg fentanyl patch was removed and put in sharps box with Nat Ribeiro as a witness.

## 2017-01-03 NOTE — SIGNIFICANT NOTE
13:00   Palliative Team Member Meeting  Attendance:    Dr.Todd Ann, DO Leda Husain, KITTY, A[facility]  Anila Lewis MDiv, [facility]  Mabel Ling, RN, PN, Director  Ayala Portillo, RN, PN  NYLA NevilleW, [facility]  Debbie Levine, Hospice Nurse  Jordyn Panchal, Hospice Nurse  Astrid ZUNIGA, Hospice

## 2017-01-04 PROBLEM — E87.1 HYPONATREMIA: Status: ACTIVE | Noted: 2017-01-01

## 2017-01-04 NOTE — SIGNIFICANT NOTE
13:00   Palliative Team Member Meeting  Attendance:    Dr.Todd Ann, KITTY Hendricks, A[facility]PN  Anila Lewis MDiv, [facility]  Mabel Ling, RN, PN, Director  Ayala Portillo RN, PN  NYLA NevilleW, [facility]

## 2017-01-04 NOTE — PROGRESS NOTES
"   LOS: 2 days    Patient Care Team:  KITTY Aguila as PCP - General (Family Medicine)  Anil Dang MD as Consulting Physician (Medical Oncology)  Luana Ramirez MD as Consulting Physician (Radiation Oncology)  Wiliam Phillips MD as Surgeon (Neurosurgery)  Vikram Jaeger MD (Anesthesiology)    Reason For Visit:  F/U DONALDO  Subjective           Review of Systems:    Pulm: No soa   CV:  No CP      Objective       atorvastatin 40 mg Oral Daily   calcium acetate 667 mg Oral TID With Meals   citalopram 40 mg Oral Daily   fentaNYL 1 patch Transdermal Q72H   furosemide 80 mg Intravenous Once   gabapentin 300 mg Oral Q6H   heparin (porcine) 5,000 Units Subcutaneous Q8H   lidocaine 1 patch Transdermal Q24H   custom cream builder  Topical Q3H   nystatin 5 mL Oral 4x Daily   pantoprazole 40 mg Intravenous Q12H   sodium polystyrene 15 g Oral Once   sodium polystyrene 30 g Oral Once       HYDROmorphone    Pharmacy Consult - Pharmacy to dose          Vital Signs:  Blood pressure 97/51, pulse 87, temperature 97.7 °F (36.5 °C), temperature source Oral, resp. rate 18, height 67\" (170.2 cm), weight 239 lb 3 oz (108 kg), SpO2 94 %, not currently breastfeeding.    Flowsheet Rows         First Filed Value    Admission Height  67\" (170.2 cm) Documented at 01/02/2017 1329    Admission Weight  236 lb (107 kg) Documented at 01/02/2017 1329          01/03 0701 - 01/04 0700  In: 1000 [I.V.:1000]  Out: 25 [Urine:25]    Physical Exam:    General Appearance: NAD, alert and cooperative, Ox3  Eyes: PER, conjunctivae and sclerae normal, no icterus  Lungs: respirations regular and unlabored, no crepitus, clear to auscultation  Heart/CV: regular rhythm & normal rate, no murmur, no gallop, no rub and no edema  Abdomen: + distended, soft, non-tender, no masses,  bowel sounds present  Skin: No rash, Warm and dry    Radiology: RENAL U/S R KIDNEY OK. L HYDRONEPHROSIS.            Labs: URIC ACID 14.1. .     Results from last 7 days  Lab " Units 01/02/17  1521   WBC 10*3/mm3 11.10*   HEMOGLOBIN g/dL 9.1*   HEMATOCRIT % 27.6*   PLATELETS 10*3/mm3 361       Results from last 7 days  Lab Units 01/04/17  0410 01/03/17  1631 01/02/17  1521   SODIUM mmol/L 128* 131* 132   POTASSIUM mmol/L 6.3* 6.3* 6.6*   CHLORIDE mmol/L 98* 98* 94*   TOTAL CO2 mmol/L 17.0* 19.0* 22.0   BUN mg/dL 99* 97* 89*   CREATININE mg/dL 6.10* 5.70* 5.00*   CALCIUM mg/dL 9.1 9.8 11.0*   PHOSPHORUS mg/dL 5.8*  --  6.1*   MAGNESIUM mg/dL  --   --  1.8   ALBUMIN g/dL 3.20  --  3.60       Results from last 7 days  Lab Units 01/04/17  0410   GLUCOSE mg/dL 88         Results from last 7 days  Lab Units 01/02/17  1521   ALK PHOS U/L 145*   BILIRUBIN mg/dL 0.2*   ALT (SGPT) U/L 17   AST (SGOT) U/L 28                 Estimated Creatinine Clearance: 13.2 mL/min (by C-G formula based on Cr of 6.1).      Assessment     Principal Problem:    Acute renal failure  Active Problems:    Stage IV ER positive breast cancer with bone metastasis    Metastatic cancer to bone    Diabetic neuropathy    Stage IIA Endometrial cancer    Diabetes mellitus, type 2    Ascites, likely malignant    N/V, dehydration    Anemia due to chemotherapy    Hyperkalemia    Malignant bone pain            Impression: ANURIC DONALDO. HYPERKALEMIA.             Recommendations: STOP IVF. IV LASIX. KAYEXALATE. PATIENT REAFFIRMS DECISION FOR NO DIALYSIS.      Stan Hogan MD  01/04/17  2:06 PM

## 2017-01-04 NOTE — PLAN OF CARE
Problem: Patient Care Overview (Adult)  Goal: Plan of Care Review  Outcome: Ongoing (interventions implemented as appropriate)    01/04/17 1100   Coping/Psychosocial Response Interventions   Plan Of Care Reviewed With patient   Patient Care Overview   Progress declining   Outcome Evaluation   Outcome Summary/Follow up Plan Abdomen increased in size, difficult moving around, increase edema, knees starting to mottle, states she is unable to eat or drink, states she wants to be a full code and continue receiving aggresive care, states she does not want to die, understand she is dying but wants to live. Did complete health care surrogate and chose her sister as her decision maker

## 2017-01-04 NOTE — CONSULTS
Palliative Care Progress Note    Date of Admission: 1/2/2017    Subjective: patient laying in bed notes exhausted after yesterday and realizing severity of cancer and ARF. No change in esophagus pain although PCA HM helping. Notes bloating feeling getting worse.  Current Facility-Administered Medications on File Prior to Encounter   Medication Dose Route Frequency Provider Last Rate Last Dose   • heparin flush (porcine) 100 UNIT/ML injection 500 Units  500 Units Intracatheter PRN Anil Dang MD   500 Units at 08/17/16 1221   • sodium chloride 0.9 % flush 10 mL  10 mL Intravenous PRN Anil Dang MD   10 mL at 08/17/16 1221     Current Outpatient Prescriptions on File Prior to Encounter   Medication Sig Dispense Refill   • atorvastatin (LIPITOR) 40 MG tablet take 1 tablet by mouth once daily  0   • calcium acetate (PHOSLO) 667 MG capsule Take 667 mg by mouth 4 (four) times a day.     • cholecalciferol (VITAMIN D3) 1000 UNITS tablet Take 5,000 Units by mouth daily.     • citalopram (CeleXA) 40 MG tablet Take 40 mg by mouth Daily.     • diphenhydrAMINE (BENADRYL) 25 mg capsule Take 25 mg by mouth every 6 (six) hours as needed for itching.     • fentaNYL (DURAGESIC) 25 MCG/HR patch Place 1 patch on the skin every third day.     • gabapentin (NEURONTIN) 300 MG capsule Take 300 mg by mouth 4 (four) times a day.     • glimepiride (AMARYL) 4 MG tablet take 1 tablet once daily  0   • HYDROcodone-acetaminophen (NORCO) 5-325 MG per tablet Take 1 tablet by mouth every 4 (four) hours as needed for moderate pain (4-6). 30 tablet 0   • ibuprofen (ADVIL,MOTRIN) 200 MG tablet Take 800 mg by mouth 2 (two) times a day.     • lisinopril (PRINIVIL,ZESTRIL) 2.5 MG tablet take 1 tablet by mouth once daily  0   • metFORMIN (GLUCOPHAGE) 1000 MG tablet Take 1,000 mg by mouth 2 (two) times a day with meals.     • ondansetron (ZOFRAN) 8 MG tablet Take 1 tablet by mouth 3 (Three) Times a Day As Needed for nausea or vomiting. 30  "tablet 5   • polyethylene glycol (MIRALAX) packet Take 17 g by mouth daily as needed (constipation). 119 g 0   • traMADol (ULTRAM) 50 MG tablet Take 50 mg by mouth 2 (two) times a day.         HYDROmorphone     Pharmacy Consult - Pharmacy to dose     sodium chloride 75 mL/hr Last Rate: 125 mL/hr (01/04/17 0800)     dextrose  •  dextrose  •  glucagon (human recombinant)  •  HYDROcodone-acetaminophen  •  LORazepam  •  Morphine **AND** naloxone  •  naloxone  •  ondansetron  •  Pharmacy Consult - Pharmacy to dose  •  polyethylene glycol  •  promethazine **OR** promethazine  •  sodium chloride  •  sodium chloride    Objective:   Visit Vitals   • BP 97/51 (BP Location: Right arm, Patient Position: Lying)   • Pulse 87   • Temp 97.7 °F (36.5 °C) (Oral)   • Resp 18   • Ht 67\" (170.2 cm)   • Wt 239 lb 3 oz (108 kg)   • SpO2 94%   • BMI 37.46 kg/m2        Intake/Output Summary (Last 24 hours) at 01/04/17 1112  Last data filed at 01/04/17 0123   Gross per 24 hour   Intake   1000 ml   Output     25 ml   Net    975 ml     Physical Exam:      General Appearance:    Alert, cooperative, in no acute distress   Head:    Normocephalic, without obvious abnormality, atraumatic   Eyes:            Lids and lashes normal, conjunctivae and sclerae normal, no   icterus, no pallor, corneas clear, PERRLA   Ears:     Throat:   No oral lesions, no thrush, oral mucosa moist   Neck:   No adenopathy, supple, trachea midline, no thyromegaly, no     carotid bruit, no JVD   Back:      Lungs:     Clear to auscultation,respirations regular, even and                   unlabored    Heart:    Regular rhythm and normal rate, normal S1 and S2, no            murmur, no gallop, no rub, no click   Breast Exam:    Deferred   Abdomen:     Increasingt abd girth on simple observation.Normal bowel sounds, no rebound  tenderness   Genitalia:    Deferred   Extremities:   Moves all extremities well, no edema, no cyanosis, no              redness   Pulses:   Pulses " palpable and equal bilaterally   Skin:   No bleeding, bruising or rash   Lymph nodes:   No palpable adenopathy   Neurologic:   Cranial nerves 2 - 12 grossly intact, sensation intact, DTR        present and equal bilaterally       Results from last 7 days  Lab Units 01/02/17  1521   WBC 10*3/mm3 11.10*   HEMOGLOBIN g/dL 9.1*   HEMATOCRIT % 27.6*   PLATELETS 10*3/mm3 361       Results from last 7 days  Lab Units 01/04/17  0410  01/02/17  1521   SODIUM mmol/L 128*  < > 132   POTASSIUM mmol/L 6.3*  < > 6.6*   CHLORIDE mmol/L 98*  < > 94*   TOTAL CO2 mmol/L 17.0*  < > 22.0   BUN mg/dL 99*  < > 89*   CREATININE mg/dL 6.10*  < > 5.00*   CALCIUM mg/dL 9.1  < > 11.0*   BILIRUBIN mg/dL  --   --  0.2*   ALK PHOS U/L  --   --  145*   ALT (SGPT) U/L  --   --  17   AST (SGOT) U/L  --   --  28   GLUCOSE mg/dL 88  < > 108*   < > = values in this interval not displayed.    Impression: Plan: pain: will increase PCA HM to 0.2 mg q 30 min prn . Continue Fentanyl at 50 ( may need dose titration) Continue magic mouthwash and Nystatin with IV protonix. May need to add po liquid carafate but [patient having difficulty swallowing. Goals: patient now more aware of dire situation and likelyhood of decline if ARF not resolving. Will decrease IVF to 75cc/hr and observe. Patient agrees . Will add Lododerm patch to r shoulder per patient request. Psychosocial issues to be supported by palliative team. Likely hospice?   Time: More than 50% of time spent in counseling and coordination of care:  Total face-to-face/floor time 40 35 min.  Time spent in counseling 35 min. Counseling included the following topics: as stated .....as above on goals and short term goal setting      Andrey Ndiaye MD  01/04/17  11:12 AM

## 2017-01-04 NOTE — PLAN OF CARE
Pt completed Living will document, established her sister Viviane Morgan as her HCS.  Document notarized, and placed in record, copy sent to HIM stat fax for entry into electronic medical record.  Original and 2 copies left with patient.

## 2017-01-05 NOTE — PROGRESS NOTES
Palliative Care Progress Note    Date of Admission: 1/2/2017    Subjective:  Patient states that she continues to have pain primarily in her chest and right shoulder area.  Doesn't feel that the pain medicine is helping sufficiently.  Current Facility-Administered Medications on File Prior to Encounter   Medication Dose Route Frequency Provider Last Rate Last Dose   • heparin flush (porcine) 100 UNIT/ML injection 500 Units  500 Units Intracatheter PRN Anil Dang MD   500 Units at 08/17/16 1221   • sodium chloride 0.9 % flush 10 mL  10 mL Intravenous PRN Anil Dang MD   10 mL at 08/17/16 1221     Current Outpatient Prescriptions on File Prior to Encounter   Medication Sig Dispense Refill   • atorvastatin (LIPITOR) 40 MG tablet take 1 tablet by mouth once daily  0   • calcium acetate (PHOSLO) 667 MG capsule Take 667 mg by mouth 4 (four) times a day.     • cholecalciferol (VITAMIN D3) 1000 UNITS tablet Take 5,000 Units by mouth daily.     • citalopram (CeleXA) 40 MG tablet Take 40 mg by mouth Daily.     • diphenhydrAMINE (BENADRYL) 25 mg capsule Take 25 mg by mouth every 6 (six) hours as needed for itching.     • fentaNYL (DURAGESIC) 25 MCG/HR patch Place 1 patch on the skin every third day.     • gabapentin (NEURONTIN) 300 MG capsule Take 300 mg by mouth 4 (four) times a day.     • glimepiride (AMARYL) 4 MG tablet take 1 tablet once daily  0   • HYDROcodone-acetaminophen (NORCO) 5-325 MG per tablet Take 1 tablet by mouth every 4 (four) hours as needed for moderate pain (4-6). 30 tablet 0   • ibuprofen (ADVIL,MOTRIN) 200 MG tablet Take 800 mg by mouth 2 (two) times a day.     • lisinopril (PRINIVIL,ZESTRIL) 2.5 MG tablet take 1 tablet by mouth once daily  0   • metFORMIN (GLUCOPHAGE) 1000 MG tablet Take 1,000 mg by mouth 2 (two) times a day with meals.     • ondansetron (ZOFRAN) 8 MG tablet Take 1 tablet by mouth 3 (Three) Times a Day As Needed for nausea or vomiting. 30 tablet 5   • polyethylene  "glycol (MIRALAX) packet Take 17 g by mouth daily as needed (constipation). 119 g 0   • traMADol (ULTRAM) 50 MG tablet Take 50 mg by mouth 2 (two) times a day.         HYDROmorphone      dextrose  •  dextrose  •  glucagon (human recombinant)  •  HYDROcodone-acetaminophen  •  lidocaine viscous  •  LORazepam  •  magic mouthwash  •  Morphine **AND** naloxone  •  naloxone  •  ondansetron  •  polyethylene glycol  •  promethazine **OR** promethazine  •  sodium chloride  •  sodium chloride    Objective:   Visit Vitals   • /67   • Pulse 99   • Temp 97.1 °F (36.2 °C) (Axillary)   • Resp 16   • Ht 67\" (170.2 cm)   • Wt 239 lb 3 oz (108 kg)   • SpO2 98%   • BMI 37.46 kg/m2        Intake/Output Summary (Last 24 hours) at 01/05/17 1251  Last data filed at 01/05/17 1100   Gross per 24 hour   Intake      0 ml   Output    100 ml   Net   -100 ml     Physical Exam:      General Appearance:    Alert, cooperative, in no acute distress   Head:    Normocephalic, without obvious abnormality, atraumatic   Eyes:            Lids and lashes normal, conjunctivae and sclerae normal, no   icterus, no pallor, corneas clear, PERRLA   Ears:    Ears appear intact with no abnormalities noted   Throat:   No oral lesions, no thrush, oral mucosa moist   Neck:   No adenopathy, supple, trachea midline, no thyromegaly, no     carotid bruit, no JVD   Back:     No kyphosis present, no scoliosis present, no skin lesions,       erythema or scars, no tenderness to percussion or                   palpation,   range of motion normal   Lungs:     Clear to auscultation,respirations regular, even and                   unlabored    Heart:    Regular rhythm and normal rate, normal S1 and S2, no            murmur, no gallop, no rub, no click   Breast Exam:    Deferred   Abdomen:     Normal bowel sounds, no masses, no organomegaly, soft        non-tender, non-distended, no guarding, no rebound                 tenderness   Genitalia:    Deferred   Extremities:   " Moves all extremities well, no edema, no cyanosis, no              redness   Pulses:   Pulses palpable and equal bilaterally   Skin:   No bleeding, bruising or rash   Lymph nodes:   No palpable adenopathy   Neurologic:   Cranial nerves 2 - 12 grossly intact, sensation intact, DTR        present and equal bilaterally       Results from last 7 days  Lab Units 01/02/17  1521   WBC 10*3/mm3 11.10*   HEMOGLOBIN g/dL 9.1*   HEMATOCRIT % 27.6*   PLATELETS 10*3/mm3 361       Results from last 7 days  Lab Units 01/04/17  0410  01/02/17  1521   SODIUM mmol/L 128*  < > 132   POTASSIUM mmol/L 6.3*  < > 6.6*   CHLORIDE mmol/L 98*  < > 94*   TOTAL CO2 mmol/L 17.0*  < > 22.0   BUN mg/dL 99*  < > 89*   CREATININE mg/dL 6.10*  < > 5.00*   CALCIUM mg/dL 9.1  < > 11.0*   BILIRUBIN mg/dL  --   --  0.2*   ALK PHOS U/L  --   --  145*   ALT (SGPT) U/L  --   --  17   AST (SGOT) U/L  --   --  28   GLUCOSE mg/dL 88  < > 108*   < > = values in this interval not displayed.    Impression: Chest pain  Breast cancer with bone mets  Endometrial cancer  Neoplastic pain  Plan: We will go ahead and adjust the patient's PCA by increasing it and putting her on a basal rate.  Also go ahead and add IV Decadron as the patient's pain could be related to bony metastasis.        Mirza Hernandez,   01/05/17  12:51 PM

## 2017-01-05 NOTE — PROGRESS NOTES
[facility] Medicine Services  INPATIENT PROGRESS NOTE    Date of Admission: 1/2/2017  Length of Stay: 2  Primary Care Physician: KITTY Li    Subjective     Chief Complaint: chest pain   HPI:  Main complaint is still pain.  PCA helping and dose increased today.  Seems more resigned today.    Review Of Systems:   Review of Systems   Constitutional: Positive for fatigue. Negative for fever.   Cardiovascular: Positive for chest pain and leg swelling.   Gastrointestinal: Positive for abdominal pain.   All other systems reviewed and are negative.    Objective      Temp:  [97.5 °F (36.4 °C)-98.3 °F (36.8 °C)] 97.5 °F (36.4 °C)  Heart Rate:  [] 109  Resp:  [18-20] 20  BP: ()/(45-72) 108/54  Physical Exam   Constitutional: She is oriented to person, place, and time. She appears well-developed.   Chronically ill appearing, no acute distress   HENT:   Head: Normocephalic.   Losing hair   Eyes: Pupils are equal, round, and reactive to light.   Cardiovascular: Normal heart sounds.    Tachy in low 100s   Pulmonary/Chest:   Diminished left base, unchanged   Abdominal:   Distended, mild tenderness diffusely   Musculoskeletal: She exhibits edema.   Neurological: She is alert and oriented to person, place, and time.   Skin: Skin is warm and dry. No erythema.   Psychiatric:   Depressed affect   Vitals reviewed.    Results Review:    I have reviewed the labs, radiology results and diagnostic studies.      Results from last 7 days  Lab Units 01/02/17  1521   WBC 10*3/mm3 11.10*   HEMOGLOBIN g/dL 9.1*   PLATELETS 10*3/mm3 361       Results from last 7 days  Lab Units 01/04/17  0410   SODIUM mmol/L 128*   POTASSIUM mmol/L 6.3*   TOTAL CO2 mmol/L 17.0*   CREATININE mg/dL 6.10*   GLUCOSE mg/dL 88     Radiology Data:   Abd CT - new asictes, progression of cancer, some left hydro  I have reviewed the medications.    Assessment/Plan     Assessment/Problem List  Principal Problem:    Acute  renal failure  Active Problems:    Stage IV ER positive breast cancer with bone metastasis    Metastatic cancer to bone    Diabetic neuropathy    Stage IIA Endometrial cancer    Diabetes mellitus, type 2    Ascites, likely malignant    N/V, dehydration    Anemia due to chemotherapy    Hyperkalemia    Malignant bone pain    Hyponatremia    Plan  - Cr essentially unchanged.  Likely biggest component of renal failure is obstructive.  - trying kayexalate for K+, but having difficulty tolerating.  - reviewed Dr. Lopez's note.  No further treatment options and has progressed on 2nd line therapy.  Agree with plan to focus on comfort.  - appreciate palliative care eval, pain control and PCA per them.  - patient struggling her with prognosis, but seems to understand there is nothing we can do.  Continue to work on goals of care.    High complexity.    Hasmukh Barraza MD   01/04/17   8:34 PM    Please note that portions of this note may have been completed with a voice recognition program. Efforts were made to edit the dictations, but occasionally words are mistranscribed.

## 2017-01-05 NOTE — PROGRESS NOTES
[facility] Medicine Services  INPATIENT PROGRESS NOTE    Date of Admission: 1/2/2017  Length of Stay: 3  Primary Care Physician: KITTY Li    Subjective     Chief Complaint: chest pain   HPI:  Still c/o pain in her shoulders ( paliative increased PCA dose). Joyce CP, no SOB at rest, no F/C.    Review Of Systems:   Review of Systems   Constitutional: Positive for fatigue. Negative for fever.   Cardiovascular: Positive for chest pain and leg swelling.   Gastrointestinal: Positive for abdominal pain.   All other systems reviewed and are negative.    Objective      Temp:  [97.1 °F (36.2 °C)-97.5 °F (36.4 °C)] 97.1 °F (36.2 °C)  Heart Rate:  [] 99  Resp:  [16-20] 16  BP: ()/(40-67) 101/67  Physical Exam   Constitutional: She is oriented to person, place, and time. She appears well-developed.   Chronically ill appearing, no acute distress   HENT:   Head: Normocephalic.   Losing hair   Eyes: Pupils are equal, round, and reactive to light.   Cardiovascular: Normal heart sounds.    Tachy in low 100s   Pulmonary/Chest:   Diminished left base, unchanged   Abdominal:   Distended, mild tenderness diffusely   Musculoskeletal: She exhibits edema.   Neurological: She is alert and oriented to person, place, and time.   Skin: Skin is warm and dry. No erythema.   Psychiatric:   Depressed affect   Vitals reviewed.    Results Review:    I have reviewed the labs, radiology results and diagnostic studies.      Results from last 7 days  Lab Units 01/02/17  1521   WBC 10*3/mm3 11.10*   HEMOGLOBIN g/dL 9.1*   PLATELETS 10*3/mm3 361       Results from last 7 days  Lab Units 01/04/17  0410   SODIUM mmol/L 128*   POTASSIUM mmol/L 6.3*   TOTAL CO2 mmol/L 17.0*   CREATININE mg/dL 6.10*   GLUCOSE mg/dL 88     Radiology Data:   Abd CT - new asictes, progression of cancer, some left hydro  I have reviewed the medications.    Assessment/Plan     Assessment/Problem List  Principal Problem:    Acute renal  failure  Active Problems:    Stage IV ER positive breast cancer with bone metastasis    Metastatic cancer to bone    Diabetic neuropathy    Stage IIA Endometrial cancer    Diabetes mellitus, type 2    Ascites, likely malignant    N/V, dehydration    Anemia due to chemotherapy    Hyperkalemia    Malignant bone pain    Hyponatremia    Plan  - try to adjust pain medication to better control the pain  - cont current supportive care.    Axel Barrow MD   01/05/17   1:14 PM    Please note that portions of this note may have been completed with a voice recognition program. Efforts were made to edit the dictations, but occasionally words are mistranscribed.

## 2017-01-05 NOTE — NURSING NOTE
Patient attempting to take kayexalate, very slow progress.   Pt adv. To make best effort, if could not complete, attempt has been made.

## 2017-01-05 NOTE — PLAN OF CARE
Problem: Patient Care Overview (Adult)  Goal: Plan of Care Review  Outcome: Ongoing (interventions implemented as appropriate)    01/05/17 1024   Coping/Psychosocial Response Interventions   Plan Of Care Reviewed With patient;sibling  (sister Viviane)   Patient Care Overview   Progress declining   Outcome Evaluation   Outcome Summary/Follow up Plan Patient abdomen increased in size, difficult being comfortable, knows she is getting worse, patient and her sister Viviane agree that she would not want chest compression, being shocked, or placed on life support, she wants aggressive care up until her heart stops or she stops breathing

## 2017-01-05 NOTE — SIGNIFICANT NOTE
13:00   Palliative Team Member Meeting  Attendance:    Dr. Mirza Hernandez, DO Julieta Garcia, DNP, APRN, ACHPN  Anila Lewis MDiv, [facility]  Mabel Ling, RN, PN, Director  Ayala Portillo RN, CHPN

## 2017-01-05 NOTE — PLAN OF CARE
Problem: Oncology Care (Adult)  Goal: Signs and Symptoms of Listed Potential Problems Will be Absent or Manageable (Oncology Care)  Outcome: Ongoing (interventions implemented as appropriate)    01/05/17 1615   Oncology Care   Problems Assessed (Oncology Care) all   Problems Present (Oncology Care) fatigue;pain         Problem: Skin Integrity Impairment, Risk/Actual (Adult)  Goal: Identify Related Risk Factors and Signs and Symptoms  Outcome: Ongoing (interventions implemented as appropriate)    01/05/17 1615   Skin Integrity Impairment, Risk/Actual   Skin Integrity Impairment, Risk/Actual: Related Risk Factors edema;fluid/nutrition status;metabolic imbalance;moisture;phototherapy;tissue perfusion impaired       Goal: Skin Integrity/Wound Healing  Outcome: Ongoing (interventions implemented as appropriate)

## 2017-01-05 NOTE — PROGRESS NOTES
"   LOS: 3 days    Patient Care Team:  KITTY Aguila as PCP - General (Family Medicine)  Anil Dang MD as Consulting Physician (Medical Oncology)  Luana Ramirez MD as Consulting Physician (Radiation Oncology)  Wiliam Phillips MD as Surgeon (Neurosurgery)  Vikram Jaeger MD (Anesthesiology)    Reason For Visit:  F/U DONALDO  Subjective           Review of Systems:    Pulm: No soa. GI: C/O ABD PAIN   CV:  No CP      Objective       atorvastatin 40 mg Oral Daily   calcium acetate 667 mg Oral TID With Meals   citalopram 40 mg Oral Daily   dexamethasone 4 mg Intravenous Daily   fentaNYL 1 patch Transdermal Q72H   gabapentin 300 mg Oral Q6H   heparin (porcine) 5,000 Units Subcutaneous Q8H   lidocaine 1 patch Transdermal Q24H   nystatin 5 mL Oral 4x Daily   pantoprazole 40 mg Intravenous Q12H   sodium polystyrene 15 g Oral Once       HYDROmorphone          Vital Signs:  Blood pressure 101/67, pulse 99, temperature 97.1 °F (36.2 °C), temperature source Axillary, resp. rate 16, height 67\" (170.2 cm), weight 239 lb 3 oz (108 kg), SpO2 98 %, not currently breastfeeding.    Flowsheet Rows         First Filed Value    Admission Height  67\" (170.2 cm) Documented at 01/02/2017 1329    Admission Weight  236 lb (107 kg) Documented at 01/02/2017 1329          01/04 0701 - 01/05 0700  In: -   Out: 75 [Urine:75]    Physical Exam:    General Appearance: NAD, alert and cooperative, Ox3  Eyes: PER, conjunctivae and sclerae normal, no icterus  Lungs: respirations regular and unlabored, no crepitus, clear to auscultation  Heart/CV: regular rhythm & normal rate, no murmur, no gallop, no rub and TR edema  Abdomen: not distended, soft, tender, no masses,  bowel sounds present  Skin: No rash, Warm and dry    Radiology:            Labs:    Results from last 7 days  Lab Units 01/02/17  1521   WBC 10*3/mm3 11.10*   HEMOGLOBIN g/dL 9.1*   HEMATOCRIT % 27.6*   PLATELETS 10*3/mm3 361       Results from last 7 days  Lab Units 01/04/17  0410 " 01/03/17  1631 01/02/17  1521   SODIUM mmol/L 128* 131* 132   POTASSIUM mmol/L 6.3* 6.3* 6.6*   CHLORIDE mmol/L 98* 98* 94*   TOTAL CO2 mmol/L 17.0* 19.0* 22.0   BUN mg/dL 99* 97* 89*   CREATININE mg/dL 6.10* 5.70* 5.00*   CALCIUM mg/dL 9.1 9.8 11.0*   PHOSPHORUS mg/dL 5.8*  --  6.1*   MAGNESIUM mg/dL  --   --  1.8   ALBUMIN g/dL 3.20  --  3.60       Results from last 7 days  Lab Units 01/04/17  0410   GLUCOSE mg/dL 88         Results from last 7 days  Lab Units 01/02/17  1521   ALK PHOS U/L 145*   BILIRUBIN mg/dL 0.2*   ALT (SGPT) U/L 17   AST (SGOT) U/L 28                 Estimated Creatinine Clearance: 13.2 mL/min (by C-G formula based on Cr of 6.1).      Assessment     Principal Problem:    Acute renal failure  Active Problems:    Stage IV ER positive breast cancer with bone metastasis    Metastatic cancer to bone    Diabetic neuropathy    Stage IIA Endometrial cancer    Diabetes mellitus, type 2    Ascites, likely malignant    N/V, dehydration    Anemia due to chemotherapy    Hyperkalemia    Malignant bone pain    Hyponatremia            Impression: ANURIC DONALDO. NO LAB DONE TODAY ? REASON. HYPERKALEMIA. METASTATIC END-STAGE ENDOMETRIAL CA.            Recommendations: PATIENT RE-AFFIRMS DECISION FOR NO DIALYSIS. COMFORT PRIMARY GOAL.      Stan Hogan MD  01/05/17  1:42 PM

## 2017-01-06 NOTE — SIGNIFICANT NOTE
13:00   Palliative Team Member Meeting  Attendance:    Dr. Mirza Hernandez, DO Julieta Garcia, DNP,APRN, ACHPN  Mabel Ling, RN, CHPN, Director  Ayala Portillo, RN, PN  NYLA NevilleW, [facility]-  Jordyn Panchal, Hospice Case Manager

## 2017-01-06 NOTE — PROGRESS NOTES
"   LOS: 4 days    Patient Care Team:  KITTY Aguila as PCP - General (Family Medicine)  Anil Dang MD as Consulting Physician (Medical Oncology)  Luana Ramirez MD as Consulting Physician (Radiation Oncology)  Wiliam Phillips MD as Surgeon (Neurosurgery)  Vikram Jaeger MD (Anesthesiology)    Subjective     No new events    Objective     Vital Signs:  Blood pressure 97/45, pulse 100, temperature 98.3 °F (36.8 °C), temperature source Oral, resp. rate 10, height 67\" (170.2 cm), weight 239 lb 3 oz (108 kg), SpO2 98 %, not currently breastfeeding.    Flowsheet Rows         First Filed Value    Admission Height  67\" (170.2 cm) Documented at 01/02/2017 1329    Admission Weight  236 lb (107 kg) Documented at 01/02/2017 1329          01/05 0701 - 01/06 0700  In: -   Out: 50 [Urine:50]    Physical Exam:    General Appearance: chronically ill appearing WF NAD  CV:    + edema  Lungs: respirations regular and unlabored  Abdomen: + distended  :  No finley,  Skin: No rash,      Labs:    Results from last 7 days  Lab Units 01/02/17  1521   WBC 10*3/mm3 11.10*   HEMOGLOBIN g/dL 9.1*   HEMOGLOBIN, ARTERIAL POC g/dL 9.5*   PLATELETS 10*3/mm3 361       Results from last 7 days  Lab Units 01/06/17  0432 01/04/17  0410 01/03/17  1631 01/02/17  1521   SODIUM mmol/L 131* 128* 131* 132   POTASSIUM mmol/L 7.6* 6.3* 6.3* 6.6*   CHLORIDE mmol/L 99 98* 98* 94*   TOTAL CO2 mmol/L 12.0* 17.0* 19.0* 22.0   BUN mg/dL 120* 99* 97* 89*   CREATININE mg/dL 7.80* 6.10* 5.70* 5.80*  5.00*   CALCIUM mg/dL 9.4 9.1 9.8 11.0*   PHOSPHORUS mg/dL 8.9* 5.8*  --  6.1*   MAGNESIUM mg/dL  --   --   --  1.8   ALBUMIN g/dL 3.40 3.20  --  3.60       Results from last 7 days  Lab Units 01/02/17  1521   ALK PHOS U/L 145*   BILIRUBIN mg/dL 0.2*   ALT (SGPT) U/L 17   AST (SGOT) U/L 28               Estimated Creatinine Clearance: 10.3 mL/min (by C-G formula based on Cr of 7.8).      A/P:    ARF: BUN cr continue to worsen.  Anuric.  . K 7.6.  Pt refusing " HD in the past.  Agree with comfort care in the setting of endstage Cancer.      Hyperkalemia;  Unable to treat if pt cannot swallow.  Agree with holding on NG tube.    Renal will sign off for now as nothing to offer.  Agree with palliation and comfort care.       Isaac Lucas MD  01/06/17  12:45 PM

## 2017-01-06 NOTE — PROGRESS NOTES
[facility] Medicine Services  INPATIENT PROGRESS NOTE    Date of Admission: 1/2/2017  Length of Stay: 4  Primary Care Physician: KITTY Li    Subjective     Chief Complaint: chest pain   HPI:  She is complaining of spasm in her throat when she tries to eat or drink since yesterday afternoon.  At the long conversation with her about her hyperkalemia but at this point she is not willing to take any oral medication.  I also told her that if she is aggravated whenever 10 protein nasogastric tube and an at this time she does not have a decision. Joyce CP, no SOB at rest, no F/C.    Review Of Systems:   Review of Systems   Constitutional: Positive for fatigue. Negative for fever.   Cardiovascular: Positive for chest pain and leg swelling.   Gastrointestinal: Positive for abdominal pain.   All other systems reviewed and are negative.    Objective      Temp:  [97.9 °F (36.6 °C)-98.3 °F (36.8 °C)] 98.3 °F (36.8 °C)  Heart Rate:  [100] 100  Resp:  [16-20] 20  BP: ()/(45-63) 97/45  Physical Exam   Constitutional: She is oriented to person, place, and time. She appears well-developed.   Chronically ill appearing, no acute distress   HENT:   Head: Normocephalic.   Losing hair   Eyes: Pupils are equal, round, and reactive to light.   Cardiovascular: Normal heart sounds.    Tachy in low 100s   Pulmonary/Chest:   Diminished left base, unchanged   Abdominal:   Distended, mild tenderness diffusely   Musculoskeletal: She exhibits edema.   Neurological: She is alert and oriented to person, place, and time.   Skin: Skin is warm and dry. No erythema.   Psychiatric:   Depressed affect   Vitals reviewed.    Results Review:    I have reviewed the labs, radiology results and diagnostic studies.      Results from last 7 days  Lab Units 01/02/17  1521   WBC 10*3/mm3 11.10*   HEMOGLOBIN g/dL 9.1*   HEMOGLOBIN, ARTERIAL POC g/dL 9.5*   PLATELETS 10*3/mm3 361       Results from last 7 days  Lab Units  01/06/17  0432   SODIUM mmol/L 131*   POTASSIUM mmol/L 7.6*   TOTAL CO2 mmol/L 12.0*   CREATININE mg/dL 7.80*   GLUCOSE mg/dL 156*     Radiology Data:   Abd CT - new asictes, progression of cancer, some left hydro  I have reviewed the medications.    Assessment/Plan     Assessment/Problem List  Principal Problem:    Acute renal failure         Stage IV ER positive breast cancer with bone metastasis    Metastatic cancer to bone    Renal failure.  Nephrology is following but patient and family do not want to do dialysis.    Hypercalcemia secondary to above.  So far patient refuses K oxalate.  Discussed with palliative about this going to work on determining CODE STATUS and goals of care.    Diabetic neuropathy    Stage IIA Endometrial cancer    Diabetes mellitus, type 2    Ascites, likely malignant    N/V, dehydration    Anemia due to chemotherapy    Hyperkalemia    Malignant bone pain    Hyponatremia    Plan  -If patient's decide to take K oxalate orally or through nasogastric tube we will administer that.  -Labs in a.m.  - cont current supportive care.    Axel Barrow MD   01/06/17   10:11 AM    Please note that portions of this note may have been completed with a voice recognition program. Efforts were made to edit the dictations, but occasionally words are mistranscribed.

## 2017-01-06 NOTE — PLAN OF CARE
Problem: Patient Care Overview (Adult)  Goal: Plan of Care Review  Outcome: Ongoing (interventions implemented as appropriate)    01/06/17 1431   Coping/Psychosocial Response Interventions   Plan Of Care Reviewed With patient   Patient Care Overview   Progress declining       Goal: Adult Individualization and Mutuality  Outcome: Ongoing (interventions implemented as appropriate)    01/06/17 1431   Individualization   Patient Specific Interventions keeping pain and breathing controlled         Problem: Skin Integrity Impairment, Risk/Actual (Adult)  Goal: Identify Related Risk Factors and Signs and Symptoms  Outcome: Ongoing (interventions implemented as appropriate)    01/06/17 1431   Skin Integrity Impairment, Risk/Actual   Skin Integrity Impairment, Risk/Actual: Related Risk Factors fluid/nutrition status;infection/disease process       Goal: Skin Integrity/Wound Healing  Outcome: Ongoing (interventions implemented as appropriate)    01/06/17 1431   Skin Integrity Impairment, Risk/Actual (Adult)   Skin Integrity/Wound Healing making progress toward outcome

## 2017-01-06 NOTE — PROGRESS NOTES
Palliative Care Progress Note    Date of Admission: 1/2/2017    Subjective:  Nursing staff notes some increasing dyspnea today.  Patient appears to be confused so has a hard time answering subjective data  Current Facility-Administered Medications on File Prior to Encounter   Medication Dose Route Frequency Provider Last Rate Last Dose   • heparin flush (porcine) 100 UNIT/ML injection 500 Units  500 Units Intracatheter PRN Anil Dang MD   500 Units at 08/17/16 1221   • sodium chloride 0.9 % flush 10 mL  10 mL Intravenous PRN Anil Dang MD   10 mL at 08/17/16 1221     Current Outpatient Prescriptions on File Prior to Encounter   Medication Sig Dispense Refill   • atorvastatin (LIPITOR) 40 MG tablet take 1 tablet by mouth once daily  0   • calcium acetate (PHOSLO) 667 MG capsule Take 667 mg by mouth 4 (four) times a day.     • cholecalciferol (VITAMIN D3) 1000 UNITS tablet Take 5,000 Units by mouth daily.     • citalopram (CeleXA) 40 MG tablet Take 40 mg by mouth Daily.     • diphenhydrAMINE (BENADRYL) 25 mg capsule Take 25 mg by mouth every 6 (six) hours as needed for itching.     • fentaNYL (DURAGESIC) 25 MCG/HR patch Place 1 patch on the skin every third day.     • gabapentin (NEURONTIN) 300 MG capsule Take 300 mg by mouth 4 (four) times a day.     • glimepiride (AMARYL) 4 MG tablet take 1 tablet once daily  0   • HYDROcodone-acetaminophen (NORCO) 5-325 MG per tablet Take 1 tablet by mouth every 4 (four) hours as needed for moderate pain (4-6). 30 tablet 0   • ibuprofen (ADVIL,MOTRIN) 200 MG tablet Take 800 mg by mouth 2 (two) times a day.     • lisinopril (PRINIVIL,ZESTRIL) 2.5 MG tablet take 1 tablet by mouth once daily  0   • metFORMIN (GLUCOPHAGE) 1000 MG tablet Take 1,000 mg by mouth 2 (two) times a day with meals.     • ondansetron (ZOFRAN) 8 MG tablet Take 1 tablet by mouth 3 (Three) Times a Day As Needed for nausea or vomiting. 30 tablet 5   • polyethylene glycol (MIRALAX) packet Take 17  "g by mouth daily as needed (constipation). 119 g 0   • traMADol (ULTRAM) 50 MG tablet Take 50 mg by mouth 2 (two) times a day.         HYDROmorphone      dextrose  •  dextrose  •  glucagon (human recombinant)  •  HYDROcodone-acetaminophen  •  lidocaine viscous  •  LORazepam  •  magic mouthwash  •  Morphine **AND** naloxone  •  naloxone  •  ondansetron  •  polyethylene glycol  •  promethazine **OR** promethazine  •  sodium chloride  •  sodium chloride    Objective:   Visit Vitals   • BP 97/45 (BP Location: Right arm, Patient Position: Lying)   • Pulse 100   • Temp 98.3 °F (36.8 °C) (Oral)   • Resp 10   • Ht 67\" (170.2 cm)   • Wt 239 lb 3 oz (108 kg)   • SpO2 98%   • BMI 37.46 kg/m2      No intake or output data in the 24 hours ending 01/06/17 1215  Physical Exam:      General Appearance:    awake, confused, somewhat restless appearing    Head:    Normocephalic, without obvious abnormality, atraumatic   Eyes:            Lids and lashes normal, conjunctivae and sclerae normal, no   icterus, no pallor, corneas clear, PERRLA   Ears:    Ears appear intact with no abnormalities noted   Throat:   No oral lesions, no thrush, oral mucosa moist   Neck:   No adenopathy, supple, trachea midline, no thyromegaly, no     carotid bruit, no JVD   Back:     No kyphosis present, no scoliosis present, no skin lesions,       erythema or scars, no tenderness to percussion or                   palpation,   range of motion normal   Lungs:     Clear to auscultation,respirations regular, even and                   unlabored    Heart:    Regular rhythm and normal rate, normal S1 and S2, no            murmur, no gallop, no rub, no click   Breast Exam:    Deferred   Abdomen:     Normal bowel sounds, no masses, no organomegaly, soft        non-tender, non-distended, no guarding, no rebound                 tenderness   Genitalia:    Deferred   Extremities:   diffuse edema noted in both upper and lower extremities    Pulses:   Pulses palpable and " equal bilaterally   Skin:   No bleeding, bruising or rash   Lymph nodes:   No palpable adenopathy   Neurologic:   Cranial nerves 2 - 12 grossly intact, sensation intact, DTR        present and equal bilaterally       Results from last 7 days  Lab Units 01/02/17  1521   WBC 10*3/mm3 11.10*   HEMOGLOBIN g/dL 9.1*   HEMOGLOBIN, ARTERIAL POC g/dL 9.5*   HEMATOCRIT % 27.6*   HEMATOCRIT POC % 28*   PLATELETS 10*3/mm3 361       Results from last 7 days  Lab Units 01/06/17  0432  01/02/17  1521   SODIUM mmol/L 131*  < > 132   POTASSIUM mmol/L 7.6*  < > 6.6*   CHLORIDE mmol/L 99  < > 94*   TOTAL CO2 mmol/L 12.0*  < > 22.0   BUN mg/dL 120*  < > 89*   CREATININE mg/dL 7.80*  < > 5.80*  5.00*   CALCIUM mg/dL 9.4  < > 11.0*   BILIRUBIN mg/dL  --   --  0.2*   ALK PHOS U/L  --   --  145*   ALT (SGPT) U/L  --   --  17   AST (SGOT) U/L  --   --  28   GLUCOSE mg/dL 156*  < > 108*   < > = values in this interval not displayed.    Impression: Chest pain  Breast cancer with bone mets  Endometrial cancer  Neoplastic pain  Plan: I have tried contacting the patient's sister multiple times at multiple different numbers.  A probably she is on her way to the hospital so we'll recheck her shortly.  Patient does continue to decline.  Her potassium is increasing along with worsening of her kidney function.  Patient is apparently declining dialysis before in the past.  With this in mind I do not feel that treating the potassium is appropriate as her's no real fixing the potassium still pretty an NG tube down just give Kayexalate will cause more harm than benefit to the patient.  I will go ahead and increase the patient's Dilaudid infusion.        Mirza Hernandez,   01/06/17  12:15 PM

## 2017-01-06 NOTE — PROGRESS NOTES
Continued Stay Note  [facility]     Patient Name: Clary Morgan  MRN: [id number]  Today's Date: 1/6/2017    Admit Date: 1/2/2017          Discharge Plan       01/06/17 1634    Case Management/Social Work Plan    Plan ongoing    Patient/Family In Agreement With Plan yes    Additional Comments CM still following. Not medically ready for discharge. CM to assist with any needs pending hospital course.               Discharge Codes     None        Expected Discharge Date and Time     Expected Discharge Date Expected Discharge Time    Jan 7, 2017             Laura Messina RN

## 2017-01-07 NOTE — SIGNIFICANT NOTE
Exam confirms with auscultation zero audible heart tones and zero audible respirations. Ms.Kathleen SMITA Morgan was pronounced dead at 0205 on 1/7/17.  MD notified by Patient's RN.    Cheyanne Owen RN  Clinical House Supervisor  1/7/2017 2:39 AM

## 2017-01-12 ENCOUNTER — APPOINTMENT (OUTPATIENT)
Dept: RADIATION ONCOLOGY | Facility: HOSPITAL | Age: 57
End: 2017-01-12

## 2017-01-19 ENCOUNTER — APPOINTMENT (OUTPATIENT)
Dept: ONCOLOGY | Facility: HOSPITAL | Age: 57
End: 2017-01-19

## 2017-06-23 NOTE — DISCHARGE SUMMARY
Admit date: 1/2/17  Discharge date: 1/7/17      Final diagnosis:  Acute renal failure  Breast cancer with bone metastasis  Diabetic neuropathy  Ascites  Dehydration  Anemia  Hyperkalemia  Hyponatremia        Brief hospital course:  Patient is she came in the hospital with abdominal distention as well as nausea and vomiting.  Patient was admitted to the hospital started on appropriate medications.  Despite been evaluate by multiple physicians the patient continued to decline a palliative medicine was consulted.  Patient continued to decline despite appropriate care and did die while in the hospital.        Mirza Hernandez DO  12:15 PM  6/23/17